# Patient Record
Sex: FEMALE | Race: WHITE | URBAN - METROPOLITAN AREA
[De-identification: names, ages, dates, MRNs, and addresses within clinical notes are randomized per-mention and may not be internally consistent; named-entity substitution may affect disease eponyms.]

---

## 2017-07-16 ENCOUNTER — EMERGENCY (EMERGENCY)
Facility: HOSPITAL | Age: 77
LOS: 1 days | End: 2017-07-16
Payer: MEDICARE

## 2017-07-16 PROCEDURE — 27786 TREATMENT OF ANKLE FRACTURE: CPT | Mod: 54

## 2017-07-16 PROCEDURE — 99284 EMERGENCY DEPT VISIT MOD MDM: CPT | Mod: 25

## 2017-07-16 PROCEDURE — 73610 X-RAY EXAM OF ANKLE: CPT | Mod: 26,RT

## 2023-02-02 PROBLEM — I25.10 CORONARY ARTERY CALCIFICATION SEEN ON CT SCAN: Status: ACTIVE | Noted: 2023-02-02

## 2023-02-02 PROBLEM — J34.2 DEVIATED SEPTUM: Status: ACTIVE | Noted: 2023-02-02

## 2023-02-02 PROBLEM — M50.90 CERVICAL DISC DISORDER: Status: ACTIVE | Noted: 2023-02-02

## 2023-02-02 PROBLEM — M54.9 UPPER BACK PAIN: Status: ACTIVE | Noted: 2023-02-02

## 2023-02-02 PROBLEM — H61.302: Status: ACTIVE | Noted: 2023-02-02

## 2023-02-02 PROBLEM — D72.9 NEUTROPHILIA: Status: ACTIVE | Noted: 2023-02-02

## 2023-02-02 PROBLEM — R91.1 PULMONARY NODULE: Status: ACTIVE | Noted: 2023-02-02

## 2023-02-02 PROBLEM — G47.01 INSOMNIA DUE TO MEDICAL CONDITION: Status: ACTIVE | Noted: 2023-02-02

## 2023-02-02 PROBLEM — M54.2 CHRONIC NECK AND BACK PAIN: Status: ACTIVE | Noted: 2023-02-02

## 2023-02-02 PROBLEM — M26.609 TEMPOROMANDIBULAR JOINT DYSFUNCTION SYNDROME: Status: ACTIVE | Noted: 2023-02-02

## 2023-02-02 PROBLEM — I10 ESSENTIAL HYPERTENSION: Status: ACTIVE | Noted: 2023-02-02

## 2023-02-02 PROBLEM — J30.0 VASOMOTOR RHINITIS: Status: ACTIVE | Noted: 2023-02-02

## 2023-02-02 PROBLEM — M48.10 DISH (DIFFUSE IDIOPATHIC SKELETAL HYPEROSTOSIS): Status: ACTIVE | Noted: 2023-02-02

## 2023-02-02 PROBLEM — R79.89 ABNORMAL CBC: Status: ACTIVE | Noted: 2023-02-02

## 2023-02-02 PROBLEM — R71.8 MICROCYTIC RED BLOOD CELLS: Status: ACTIVE | Noted: 2023-02-02

## 2023-02-02 PROBLEM — M51.36 DEGENERATION OF LUMBAR INTERVERTEBRAL DISC: Status: ACTIVE | Noted: 2023-02-02

## 2023-02-02 PROBLEM — D72.828 NEUTROPHILIA: Status: ACTIVE | Noted: 2023-02-02

## 2023-02-02 PROBLEM — R73.09 HEMOGLOBIN A1C LESS THAN 7.0%: Status: ACTIVE | Noted: 2023-02-02

## 2023-02-02 PROBLEM — G89.29 CHRONIC NECK AND BACK PAIN: Status: ACTIVE | Noted: 2023-02-02

## 2023-02-02 PROBLEM — E05.90 HYPERTHYROIDISM: Status: ACTIVE | Noted: 2023-02-02

## 2023-02-02 PROBLEM — M81.0 OSTEOPOROSIS: Status: ACTIVE | Noted: 2023-02-02

## 2023-02-02 PROBLEM — H93.293 AUDITORY DISCRIMINATION IMPAIRMENT, BILATERAL: Status: ACTIVE | Noted: 2023-02-02

## 2023-02-02 PROBLEM — M51.369 DEGENERATION OF LUMBAR INTERVERTEBRAL DISC: Status: ACTIVE | Noted: 2023-02-02

## 2023-02-02 PROBLEM — H90.3 BILATERAL SENSORINEURAL HEARING LOSS: Status: ACTIVE | Noted: 2023-02-02

## 2023-02-02 PROBLEM — H61.23 BILATERAL IMPACTED CERUMEN: Status: ACTIVE | Noted: 2023-02-02

## 2023-02-02 PROBLEM — E78.5 MILD HYPERLIPIDEMIA: Status: ACTIVE | Noted: 2023-02-02

## 2023-02-02 PROBLEM — R49.0 HOARSENESS OF VOICE: Status: ACTIVE | Noted: 2023-02-02

## 2023-02-02 PROBLEM — R35.0 URINARY FREQUENCY: Status: ACTIVE | Noted: 2023-02-02

## 2023-02-02 PROBLEM — M54.9 CHRONIC NECK AND BACK PAIN: Status: ACTIVE | Noted: 2023-02-02

## 2023-02-02 PROBLEM — M48.061 SPINAL STENOSIS OF LUMBAR REGION WITHOUT NEUROGENIC CLAUDICATION: Status: ACTIVE | Noted: 2023-02-02

## 2023-02-02 PROBLEM — L50.9 URTICARIA: Status: ACTIVE | Noted: 2023-02-02

## 2023-02-02 PROBLEM — R10.30 LOWER ABDOMINAL PAIN: Status: ACTIVE | Noted: 2023-02-02

## 2023-02-02 PROBLEM — E05.00 GRAVES' OPHTHALMOPATHY: Status: ACTIVE | Noted: 2023-02-02

## 2023-02-02 PROBLEM — H61.893 EAR CANAL DRYNESS, BILATERAL: Status: ACTIVE | Noted: 2023-02-02

## 2023-02-02 PROBLEM — R74.8 ELEVATED LIVER ENZYMES: Status: ACTIVE | Noted: 2023-02-02

## 2023-02-02 PROBLEM — E55.9 VITAMIN D DEFICIENCY: Status: ACTIVE | Noted: 2023-02-02

## 2023-02-02 PROBLEM — R79.89 LOW VITAMIN B12 LEVEL: Status: ACTIVE | Noted: 2023-02-02

## 2023-02-02 PROBLEM — R73.03 PREDIABETES: Status: ACTIVE | Noted: 2023-02-02

## 2023-02-02 PROBLEM — R49.9 HOARSENESS OR CHANGING VOICE: Status: ACTIVE | Noted: 2023-02-02

## 2023-02-02 PROBLEM — J98.11 ATELECTASIS PULMONARY: Status: ACTIVE | Noted: 2023-02-02

## 2023-02-02 PROBLEM — H61.22 EXCESSIVE CERUMEN IN EAR CANAL, LEFT: Status: ACTIVE | Noted: 2023-02-02

## 2023-02-02 PROBLEM — E89.0 POSTOPERATIVE HYPOTHYROIDISM: Status: ACTIVE | Noted: 2023-02-02

## 2023-02-02 PROBLEM — K21.9 GERD WITHOUT ESOPHAGITIS: Status: ACTIVE | Noted: 2023-02-02

## 2023-02-02 PROBLEM — R53.83 FATIGUE: Status: ACTIVE | Noted: 2023-02-02

## 2023-02-02 PROBLEM — M72.2 PLANTAR FASCIITIS: Status: ACTIVE | Noted: 2023-02-02

## 2023-02-02 PROBLEM — R82.90 ABNORMAL URINALYSIS: Status: ACTIVE | Noted: 2023-02-02

## 2023-02-02 RX ORDER — ROSUVASTATIN CALCIUM 5 MG/1
1 TABLET, COATED ORAL DAILY
COMMUNITY
Start: 2022-10-05 | End: 2023-07-06

## 2023-02-02 RX ORDER — AMLODIPINE BESYLATE 5 MG/1
5 TABLET ORAL DAILY
COMMUNITY
Start: 2016-09-07 | End: 2023-06-01 | Stop reason: SDUPTHER

## 2023-02-02 RX ORDER — ESTRADIOL 10 UG/1
10 INSERT VAGINAL 2 TIMES WEEKLY
COMMUNITY
Start: 2014-06-30

## 2023-02-02 RX ORDER — LEVOTHYROXINE SODIUM 75 UG/1
TABLET ORAL
COMMUNITY
Start: 2022-07-25

## 2023-02-02 RX ORDER — AMLODIPINE BESYLATE 2.5 MG/1
1 TABLET ORAL DAILY
COMMUNITY
Start: 2016-04-20 | End: 2023-06-01 | Stop reason: SDUPTHER

## 2023-03-15 ENCOUNTER — APPOINTMENT (OUTPATIENT)
Dept: PRIMARY CARE | Facility: CLINIC | Age: 83
End: 2023-03-15
Payer: MEDICARE

## 2023-03-23 LAB
ALANINE AMINOTRANSFERASE (SGPT) (U/L) IN SER/PLAS: 18 U/L (ref 7–45)
ANION GAP IN SER/PLAS: 12 MMOL/L (ref 10–20)
CALCIDIOL (25 OH VITAMIN D3) (NG/ML) IN SER/PLAS: 66 NG/ML
CALCIUM (MG/DL) IN SER/PLAS: 9.4 MG/DL (ref 8.6–10.6)
CARBON DIOXIDE, TOTAL (MMOL/L) IN SER/PLAS: 29 MMOL/L (ref 21–32)
CHLORIDE (MMOL/L) IN SER/PLAS: 102 MMOL/L (ref 98–107)
CHOLESTEROL (MG/DL) IN SER/PLAS: 181 MG/DL (ref 0–199)
CHOLESTEROL IN HDL (MG/DL) IN SER/PLAS: 87.3 MG/DL
CHOLESTEROL/HDL RATIO: 2.1
CREATININE (MG/DL) IN SER/PLAS: 0.83 MG/DL (ref 0.5–1.05)
ESTIMATED AVERAGE GLUCOSE FOR HBA1C: 120 MG/DL
GFR FEMALE: 70 ML/MIN/1.73M2
GLUCOSE (MG/DL) IN SER/PLAS: 86 MG/DL (ref 74–99)
HEMOGLOBIN A1C/HEMOGLOBIN TOTAL IN BLOOD: 5.8 %
LDL: 83 MG/DL (ref 0–99)
POTASSIUM (MMOL/L) IN SER/PLAS: 4.5 MMOL/L (ref 3.5–5.3)
SODIUM (MMOL/L) IN SER/PLAS: 138 MMOL/L (ref 136–145)
THYROTROPIN (MIU/L) IN SER/PLAS BY DETECTION LIMIT <= 0.05 MIU/L: 4.84 MIU/L (ref 0.44–3.98)
THYROXINE (T4) FREE (NG/DL) IN SER/PLAS: 1.23 NG/DL (ref 0.78–1.48)
TRIGLYCERIDE (MG/DL) IN SER/PLAS: 54 MG/DL (ref 0–149)
UREA NITROGEN (MG/DL) IN SER/PLAS: 23 MG/DL (ref 6–23)
VLDL: 11 MG/DL (ref 0–40)

## 2023-03-29 ENCOUNTER — APPOINTMENT (OUTPATIENT)
Dept: PRIMARY CARE | Facility: CLINIC | Age: 83
End: 2023-03-29

## 2023-06-01 ENCOUNTER — OFFICE VISIT (OUTPATIENT)
Dept: PRIMARY CARE | Facility: CLINIC | Age: 83
End: 2023-06-01
Payer: MEDICARE

## 2023-06-01 VITALS
DIASTOLIC BLOOD PRESSURE: 70 MMHG | OXYGEN SATURATION: 96 % | HEIGHT: 62 IN | SYSTOLIC BLOOD PRESSURE: 122 MMHG | BODY MASS INDEX: 20.06 KG/M2 | WEIGHT: 109 LBS | HEART RATE: 72 BPM

## 2023-06-01 DIAGNOSIS — Z00.00 ROUTINE GENERAL MEDICAL EXAMINATION AT HEALTH CARE FACILITY: Primary | ICD-10-CM

## 2023-06-01 DIAGNOSIS — I10 ESSENTIAL HYPERTENSION: ICD-10-CM

## 2023-06-01 DIAGNOSIS — G47.00 INSOMNIA, UNSPECIFIED TYPE: ICD-10-CM

## 2023-06-01 DIAGNOSIS — E78.5 MILD HYPERLIPIDEMIA: ICD-10-CM

## 2023-06-01 DIAGNOSIS — M81.0 OSTEOPOROSIS WITHOUT CURRENT PATHOLOGICAL FRACTURE, UNSPECIFIED OSTEOPOROSIS TYPE: ICD-10-CM

## 2023-06-01 DIAGNOSIS — E89.0 POSTOPERATIVE HYPOTHYROIDISM: ICD-10-CM

## 2023-06-01 PROCEDURE — 1160F RVW MEDS BY RX/DR IN RCRD: CPT | Performed by: INTERNAL MEDICINE

## 2023-06-01 PROCEDURE — 3074F SYST BP LT 130 MM HG: CPT | Performed by: INTERNAL MEDICINE

## 2023-06-01 PROCEDURE — 1159F MED LIST DOCD IN RCRD: CPT | Performed by: INTERNAL MEDICINE

## 2023-06-01 PROCEDURE — 99214 OFFICE O/P EST MOD 30 MIN: CPT | Performed by: INTERNAL MEDICINE

## 2023-06-01 PROCEDURE — 3078F DIAST BP <80 MM HG: CPT | Performed by: INTERNAL MEDICINE

## 2023-06-01 PROCEDURE — 1036F TOBACCO NON-USER: CPT | Performed by: INTERNAL MEDICINE

## 2023-06-01 PROCEDURE — G0439 PPPS, SUBSEQ VISIT: HCPCS | Performed by: INTERNAL MEDICINE

## 2023-06-01 PROCEDURE — 1170F FXNL STATUS ASSESSED: CPT | Performed by: INTERNAL MEDICINE

## 2023-06-01 RX ORDER — HYDROXYZINE HYDROCHLORIDE 10 MG/1
10 TABLET, FILM COATED ORAL ONCE
Qty: 90 TABLET | Refills: 1 | Status: SHIPPED | OUTPATIENT
Start: 2023-06-01 | End: 2023-06-02

## 2023-06-01 RX ORDER — AMLODIPINE BESYLATE 5 MG/1
5 TABLET ORAL DAILY
Qty: 90 TABLET | Refills: 1 | Status: SHIPPED | OUTPATIENT
Start: 2023-06-01 | End: 2023-12-06

## 2023-06-01 RX ORDER — AMLODIPINE BESYLATE 2.5 MG/1
2.5 TABLET ORAL DAILY
Qty: 90 TABLET | Refills: 1 | Status: SHIPPED | OUTPATIENT
Start: 2023-06-01 | End: 2023-12-06

## 2023-06-01 ASSESSMENT — ENCOUNTER SYMPTOMS
HYPERTENSION: 1
NERVOUS/ANXIOUS: 0
FATIGUE: 0
TREMORS: 0
DEPRESSED MOOD: 0
SHORTNESS OF BREATH: 0

## 2023-06-01 ASSESSMENT — ACTIVITIES OF DAILY LIVING (ADL)
DOING_HOUSEWORK: INDEPENDENT
GROCERY_SHOPPING: INDEPENDENT
MANAGING_FINANCES: INDEPENDENT
BATHING: INDEPENDENT
DRESSING: INDEPENDENT
TAKING_MEDICATION: INDEPENDENT

## 2023-06-01 ASSESSMENT — PATIENT HEALTH QUESTIONNAIRE - PHQ9
SUM OF ALL RESPONSES TO PHQ9 QUESTIONS 1 AND 2: 0
2. FEELING DOWN, DEPRESSED OR HOPELESS: NOT AT ALL
1. LITTLE INTEREST OR PLEASURE IN DOING THINGS: NOT AT ALL

## 2023-06-01 NOTE — PROGRESS NOTES
Subjective   Reason for Visit: Vera Fleming is an 82 y.o. female here for a Medicare Wellness visit and chronic medical problems.    Past Medical, Surgical, and Family History reviewed and updated in chart.    Reviewed all medications by prescribing practitioner or clinical pharmacist (such as prescriptions, OTCs, herbal therapies and supplements) and documented in the medical record.    Mammogram and dexa handled at [facility].  On synthroid for thyroid.  Atarax for sleep, would like reorder.  Meds and labs reviewed.    Thyroid Problem  Presents for follow-up visit. Patient reports no anxiety, depressed mood, fatigue or tremors. The symptoms have been stable. Her past medical history is significant for hyperlipidemia.   Hypertension  This is a chronic problem. The current episode started more than 1 year ago. The problem has been resolved since onset. The problem is controlled. Pertinent negatives include no chest pain or shortness of breath. There are no associated agents to hypertension. The current treatment provides significant improvement. There are no compliance problems.  Identifiable causes of hypertension include a thyroid problem.   Hyperlipidemia  This is a chronic problem. The current episode started more than 1 year ago. The problem is controlled. Recent lipid tests were reviewed and are low. Exacerbating diseases include hypothyroidism. There are no known factors aggravating her hyperlipidemia. Pertinent negatives include no chest pain or shortness of breath. Current antihyperlipidemic treatment includes statins. The current treatment provides significant improvement of lipids. There are no compliance problems.        Patient Care Team:  Ronnie Bryant MD as PCP - General  Ronnie Bryant MD as PCP - [employer] PCP     Review of Systems   Constitutional:  Negative for fatigue.   Respiratory:  Negative for shortness of breath.    Cardiovascular:  Negative for chest pain.   Neurological:   "Negative for tremors.   Psychiatric/Behavioral:  The patient is not nervous/anxious.        Objective   Vitals:  /70 (BP Location: Left arm, Patient Position: Sitting)   Pulse 72   Ht 1.575 m (5' 2\")   Wt 49.4 kg (109 lb)   SpO2 96%   BMI 19.94 kg/m²       Physical Exam  Constitutional:       Appearance: Normal appearance.   Cardiovascular:      Rate and Rhythm: Normal rate and regular rhythm.      Pulses: Normal pulses.      Heart sounds: Normal heart sounds.   Pulmonary:      Effort: Pulmonary effort is normal.      Breath sounds: Normal breath sounds.   Neurological:      General: No focal deficit present.      Mental Status: She is alert.   Psychiatric:         Mood and Affect: Mood normal.         Behavior: Behavior normal.         Thought Content: Thought content normal.         Judgment: Judgment normal.       Assessment/Plan   Problem List Items Addressed This Visit          Nervous    Insomnia    Current Assessment & Plan     Agree with hydroxyzine 10 mg bedtime for sleep.         Relevant Medications    hydrOXYzine HCL (Atarax) 10 mg tablet       Circulatory    Essential hypertension    Relevant Orders    CBC and Auto Differential    Comprehensive metabolic panel    Urinalysis with Reflex Microscopic       Musculoskeletal    Osteoporosis    Current Assessment & Plan     T score -3.2 right hip.  [facility] evaluation, pt declined treatment.  Continue calcium and D3.            Endocrine/Metabolic    Postoperative hypothyroidism    Current Assessment & Plan     Thyroid, free T4 and TSH rich with levothyroxine or synthroid.  Take medication 30 to 60 minutes before breakfast.             Other    Mild hyperlipidemia    Current Assessment & Plan     Lipids, LDL at goal with statin.  Recommend low fat/cholesterol diet.          Relevant Orders    CBC and Auto Differential    Comprehensive metabolic panel    Lipid panel    Routine general medical examination at health care facility - Primary    Relevant " Orders    1 Year Follow Up In Primary Care - Wellness Exam

## 2023-06-01 NOTE — ASSESSMENT & PLAN NOTE
Thyroid, free T4 and TSH rich with levothyroxine or synthroid.  Take medication 30 to 60 minutes before breakfast.

## 2023-06-02 ENCOUNTER — APPOINTMENT (OUTPATIENT)
Dept: PRIMARY CARE | Facility: CLINIC | Age: 83
End: 2023-06-02

## 2023-06-02 DIAGNOSIS — G47.00 INSOMNIA, UNSPECIFIED TYPE: ICD-10-CM

## 2023-06-02 RX ORDER — HYDROXYZINE HYDROCHLORIDE 10 MG/1
TABLET, FILM COATED ORAL
Qty: 90 TABLET | Refills: 1 | Status: SHIPPED | OUTPATIENT
Start: 2023-06-02 | End: 2023-06-02 | Stop reason: SDUPTHER

## 2023-06-02 RX ORDER — HYDROXYZINE HYDROCHLORIDE 10 MG/1
10 TABLET, FILM COATED ORAL DAILY
Qty: 90 TABLET | Refills: 1 | Status: SHIPPED | OUTPATIENT
Start: 2023-06-02 | End: 2023-09-26 | Stop reason: SDUPTHER

## 2023-06-21 ENCOUNTER — APPOINTMENT (OUTPATIENT)
Dept: PRIMARY CARE | Facility: CLINIC | Age: 83
End: 2023-06-21

## 2023-07-06 DIAGNOSIS — E78.5 MILD HYPERLIPIDEMIA: Primary | ICD-10-CM

## 2023-07-06 RX ORDER — ROSUVASTATIN CALCIUM 5 MG/1
TABLET, COATED ORAL
Qty: 90 TABLET | Refills: 3 | Status: SHIPPED | OUTPATIENT
Start: 2023-07-06

## 2023-09-13 ENCOUNTER — APPOINTMENT (OUTPATIENT)
Dept: PRIMARY CARE | Facility: CLINIC | Age: 83
End: 2023-09-13
Payer: MEDICARE

## 2023-09-21 ENCOUNTER — LAB (OUTPATIENT)
Dept: LAB | Facility: LAB | Age: 83
End: 2023-09-21
Payer: MEDICARE

## 2023-09-21 DIAGNOSIS — I10 ESSENTIAL HYPERTENSION: ICD-10-CM

## 2023-09-21 DIAGNOSIS — E78.5 MILD HYPERLIPIDEMIA: ICD-10-CM

## 2023-09-21 LAB
ALANINE AMINOTRANSFERASE (SGPT) (U/L) IN SER/PLAS: 20 U/L (ref 7–45)
ALBUMIN (G/DL) IN SER/PLAS: 4.3 G/DL (ref 3.4–5)
ALKALINE PHOSPHATASE (U/L) IN SER/PLAS: 66 U/L (ref 33–136)
ANION GAP IN SER/PLAS: 12 MMOL/L (ref 10–20)
ASPARTATE AMINOTRANSFERASE (SGOT) (U/L) IN SER/PLAS: 24 U/L (ref 9–39)
BASOPHILS (10*3/UL) IN BLOOD BY AUTOMATED COUNT: 0.04 X10E9/L (ref 0–0.1)
BASOPHILS/100 LEUKOCYTES IN BLOOD BY AUTOMATED COUNT: 0.6 % (ref 0–2)
BILIRUBIN TOTAL (MG/DL) IN SER/PLAS: 0.6 MG/DL (ref 0–1.2)
CALCIUM (MG/DL) IN SER/PLAS: 9.5 MG/DL (ref 8.6–10.6)
CARBON DIOXIDE, TOTAL (MMOL/L) IN SER/PLAS: 31 MMOL/L (ref 21–32)
CHLORIDE (MMOL/L) IN SER/PLAS: 103 MMOL/L (ref 98–107)
CHOLESTEROL (MG/DL) IN SER/PLAS: 163 MG/DL (ref 0–199)
CHOLESTEROL IN HDL (MG/DL) IN SER/PLAS: 82.1 MG/DL
CHOLESTEROL/HDL RATIO: 2
CREATININE (MG/DL) IN SER/PLAS: 0.97 MG/DL (ref 0.5–1.05)
EOSINOPHILS (10*3/UL) IN BLOOD BY AUTOMATED COUNT: 0 X10E9/L (ref 0–0.4)
EOSINOPHILS/100 LEUKOCYTES IN BLOOD BY AUTOMATED COUNT: 0 % (ref 0–6)
ERYTHROCYTE DISTRIBUTION WIDTH (RATIO) BY AUTOMATED COUNT: 12.2 % (ref 11.5–14.5)
ERYTHROCYTE MEAN CORPUSCULAR HEMOGLOBIN CONCENTRATION (G/DL) BY AUTOMATED: 32.4 G/DL (ref 32–36)
ERYTHROCYTE MEAN CORPUSCULAR VOLUME (FL) BY AUTOMATED COUNT: 93 FL (ref 80–100)
ERYTHROCYTES (10*6/UL) IN BLOOD BY AUTOMATED COUNT: 4.67 X10E12/L (ref 4–5.2)
GFR FEMALE: 58 ML/MIN/1.73M2
GLUCOSE (MG/DL) IN SER/PLAS: 86 MG/DL (ref 74–99)
HEMATOCRIT (%) IN BLOOD BY AUTOMATED COUNT: 43.2 % (ref 36–46)
HEMOGLOBIN (G/DL) IN BLOOD: 14 G/DL (ref 12–16)
IMMATURE GRANULOCYTES/100 LEUKOCYTES IN BLOOD BY AUTOMATED COUNT: 0.2 % (ref 0–0.9)
LDL: 71 MG/DL (ref 0–99)
LEUKOCYTES (10*3/UL) IN BLOOD BY AUTOMATED COUNT: 6.7 X10E9/L (ref 4.4–11.3)
LYMPHOCYTES (10*3/UL) IN BLOOD BY AUTOMATED COUNT: 2.31 X10E9/L (ref 0.8–3)
LYMPHOCYTES/100 LEUKOCYTES IN BLOOD BY AUTOMATED COUNT: 34.7 % (ref 13–44)
MONOCYTES (10*3/UL) IN BLOOD BY AUTOMATED COUNT: 0.68 X10E9/L (ref 0.05–0.8)
MONOCYTES/100 LEUKOCYTES IN BLOOD BY AUTOMATED COUNT: 10.2 % (ref 2–10)
NEUTROPHILS (10*3/UL) IN BLOOD BY AUTOMATED COUNT: 3.61 X10E9/L (ref 1.6–5.5)
NEUTROPHILS/100 LEUKOCYTES IN BLOOD BY AUTOMATED COUNT: 54.3 % (ref 40–80)
NRBC (PER 100 WBCS) BY AUTOMATED COUNT: 0 /100 WBC (ref 0–0)
PLATELETS (10*3/UL) IN BLOOD AUTOMATED COUNT: 230 X10E9/L (ref 150–450)
POTASSIUM (MMOL/L) IN SER/PLAS: 4.5 MMOL/L (ref 3.5–5.3)
PROTEIN TOTAL: 6.7 G/DL (ref 6.4–8.2)
SODIUM (MMOL/L) IN SER/PLAS: 141 MMOL/L (ref 136–145)
TRIGLYCERIDE (MG/DL) IN SER/PLAS: 49 MG/DL (ref 0–149)
UREA NITROGEN (MG/DL) IN SER/PLAS: 25 MG/DL (ref 6–23)
VLDL: 10 MG/DL (ref 0–40)

## 2023-09-21 PROCEDURE — 80061 LIPID PANEL: CPT

## 2023-09-21 PROCEDURE — 80053 COMPREHEN METABOLIC PANEL: CPT

## 2023-09-21 PROCEDURE — 85025 COMPLETE CBC W/AUTO DIFF WBC: CPT

## 2023-09-21 PROCEDURE — 81003 URINALYSIS AUTO W/O SCOPE: CPT

## 2023-09-21 PROCEDURE — 36415 COLL VENOUS BLD VENIPUNCTURE: CPT

## 2023-09-22 LAB
APPEARANCE, URINE: CLEAR
BILIRUBIN, URINE: NEGATIVE
BLOOD, URINE: NEGATIVE
COLOR, URINE: COLORLESS
GLUCOSE, URINE: NEGATIVE MG/DL
KETONES, URINE: NEGATIVE MG/DL
LEUKOCYTE ESTERASE, URINE: NEGATIVE
NITRITE, URINE: NEGATIVE
PH, URINE: 7 (ref 5–8)
PROTEIN, URINE: NEGATIVE MG/DL
SPECIFIC GRAVITY, URINE: 1 (ref 1–1.03)
UROBILINOGEN, URINE: <2 MG/DL (ref 0–1.9)

## 2023-09-26 ENCOUNTER — OFFICE VISIT (OUTPATIENT)
Dept: PRIMARY CARE | Facility: CLINIC | Age: 83
End: 2023-09-26
Payer: MEDICARE

## 2023-09-26 VITALS
HEIGHT: 62 IN | OXYGEN SATURATION: 98 % | HEART RATE: 68 BPM | DIASTOLIC BLOOD PRESSURE: 70 MMHG | SYSTOLIC BLOOD PRESSURE: 118 MMHG | WEIGHT: 113 LBS | BODY MASS INDEX: 20.8 KG/M2

## 2023-09-26 DIAGNOSIS — J47.9 BRONCHIECTASIS, UNCOMPLICATED (MULTI): ICD-10-CM

## 2023-09-26 DIAGNOSIS — I10 ESSENTIAL HYPERTENSION: ICD-10-CM

## 2023-09-26 DIAGNOSIS — G47.00 INSOMNIA, UNSPECIFIED TYPE: ICD-10-CM

## 2023-09-26 DIAGNOSIS — N28.9 RENAL INSUFFICIENCY: ICD-10-CM

## 2023-09-26 DIAGNOSIS — E46 PROTEIN-CALORIE MALNUTRITION, UNSPECIFIED SEVERITY (MULTI): Primary | ICD-10-CM

## 2023-09-26 DIAGNOSIS — E89.0 POSTOPERATIVE HYPOTHYROIDISM: ICD-10-CM

## 2023-09-26 DIAGNOSIS — E78.5 MILD HYPERLIPIDEMIA: ICD-10-CM

## 2023-09-26 DIAGNOSIS — R73.03 PREDIABETES: ICD-10-CM

## 2023-09-26 PROBLEM — R73.09 HEMOGLOBIN A1C LESS THAN 7.0%: Status: RESOLVED | Noted: 2023-02-02 | Resolved: 2023-09-26

## 2023-09-26 PROCEDURE — 1126F AMNT PAIN NOTED NONE PRSNT: CPT | Performed by: INTERNAL MEDICINE

## 2023-09-26 PROCEDURE — 99214 OFFICE O/P EST MOD 30 MIN: CPT | Performed by: INTERNAL MEDICINE

## 2023-09-26 PROCEDURE — 3074F SYST BP LT 130 MM HG: CPT | Performed by: INTERNAL MEDICINE

## 2023-09-26 PROCEDURE — 3078F DIAST BP <80 MM HG: CPT | Performed by: INTERNAL MEDICINE

## 2023-09-26 PROCEDURE — 1036F TOBACCO NON-USER: CPT | Performed by: INTERNAL MEDICINE

## 2023-09-26 PROCEDURE — 1160F RVW MEDS BY RX/DR IN RCRD: CPT | Performed by: INTERNAL MEDICINE

## 2023-09-26 PROCEDURE — 1159F MED LIST DOCD IN RCRD: CPT | Performed by: INTERNAL MEDICINE

## 2023-09-26 RX ORDER — HYDROXYZINE HYDROCHLORIDE 10 MG/1
10-20 TABLET, FILM COATED ORAL NIGHTLY PRN
Qty: 180 TABLET | Refills: 1 | Status: SHIPPED | OUTPATIENT
Start: 2023-09-26

## 2023-09-26 ASSESSMENT — ENCOUNTER SYMPTOMS
DIZZINESS: 0
ABDOMINAL PAIN: 0
MYALGIAS: 0
FATIGUE: 0
SHORTNESS OF BREATH: 0
HYPERTENSION: 1

## 2023-09-26 NOTE — PROGRESS NOTES
"Subjective   Patient ID: Vera Fleming is a 83 y.o. female who presents for Follow-up chronic medical problems.    Here with .  Urology placed on gemtesa for urinary frequency.  Labs reviewed, GFR 58.      Hypertension  This is a chronic problem. The current episode started more than 1 year ago. The problem has been resolved since onset. The problem is controlled. Pertinent negatives include no chest pain or shortness of breath. There are no associated agents to hypertension. The current treatment provides significant improvement. There are no compliance problems.    Hyperlipidemia  This is a chronic problem. The current episode started more than 1 year ago. The problem is controlled. Recent lipid tests were reviewed and are low. Exacerbating diseases include hypothyroidism. There are no known factors aggravating her hyperlipidemia. Pertinent negatives include no chest pain, myalgias or shortness of breath. The current treatment provides significant improvement of lipids. There are no compliance problems.         Review of Systems   Constitutional:  Negative for fatigue.   Respiratory:  Negative for shortness of breath.    Cardiovascular:  Negative for chest pain.   Gastrointestinal:  Negative for abdominal pain.   Musculoskeletal:  Negative for myalgias.   Neurological:  Negative for dizziness.       Objective   /70 (BP Location: Left arm, Patient Position: Sitting)   Pulse 68   Ht 1.575 m (5' 2\")   Wt 51.3 kg (113 lb)   SpO2 98%   BMI 20.67 kg/m²     Physical Exam  Constitutional:       Appearance: Normal appearance.   Cardiovascular:      Rate and Rhythm: Normal rate and regular rhythm.      Pulses: Normal pulses.      Heart sounds: Normal heart sounds.   Pulmonary:      Effort: Pulmonary effort is normal.      Breath sounds: Normal breath sounds.   Neurological:      General: No focal deficit present.      Mental Status: She is alert.   Psychiatric:         Mood and Affect: Mood normal.        "  Behavior: Behavior normal.         Thought Content: Thought content normal.         Judgment: Judgment normal.       Assessment/Plan   Problem List Items Addressed This Visit             ICD-10-CM       Cardiac and Vasculature    Essential hypertension I10     BP at goal with current medications.  Rec low salt diet.  Check BP at home, goal < 140/90.           Mild hyperlipidemia E78.5     Lipids, LDL at goal with statin.  Recommend low fat/cholesterol diet.              Endocrine/Metabolic    Postoperative hypothyroidism E89.0     Thyroid, free T4 and TSH rich with levothyroxine or synthroid.  Take medication 30 to 60 minutes before breakfast.           Prediabetes R73.03     A1c 5.8 with diet.         Relevant Orders    Hemoglobin A1c    Protein-calorie malnutrition, unspecified severity (CMS/[facility]) - Primary E46     BMI 20.67.            Pulmonary and Pneumonias    Bronchiectasis, uncomplicated (CMS/HCC) J47.9     Asymptomatic, follow.            Sleep    Insomnia G47.00     Rec hydroxyzine 10-20 mg bedtime for sleep.         Relevant Medications    hydrOXYzine HCL (Atarax) 10 mg tablet     Other Visit Diagnoses         Codes    Renal insufficiency     N28.9    Relevant Orders    Basic metabolic panel

## 2023-09-29 ENCOUNTER — LAB (OUTPATIENT)
Dept: LAB | Facility: LAB | Age: 83
End: 2023-09-29
Payer: MEDICARE

## 2023-09-29 DIAGNOSIS — N28.9 RENAL INSUFFICIENCY: ICD-10-CM

## 2023-09-29 DIAGNOSIS — R73.03 PREDIABETES: ICD-10-CM

## 2023-09-29 LAB
ANION GAP IN SER/PLAS: 13 MMOL/L (ref 10–20)
CALCIUM (MG/DL) IN SER/PLAS: 9 MG/DL (ref 8.6–10.6)
CARBON DIOXIDE, TOTAL (MMOL/L) IN SER/PLAS: 26 MMOL/L (ref 21–32)
CHLORIDE (MMOL/L) IN SER/PLAS: 103 MMOL/L (ref 98–107)
CREATININE (MG/DL) IN SER/PLAS: 0.85 MG/DL (ref 0.5–1.05)
ESTIMATED AVERAGE GLUCOSE FOR HBA1C: 123 MG/DL
GFR FEMALE: 68 ML/MIN/1.73M2
GLUCOSE (MG/DL) IN SER/PLAS: 79 MG/DL (ref 74–99)
HEMOGLOBIN A1C/HEMOGLOBIN TOTAL IN BLOOD: 5.9 %
POTASSIUM (MMOL/L) IN SER/PLAS: 4.2 MMOL/L (ref 3.5–5.3)
SODIUM (MMOL/L) IN SER/PLAS: 138 MMOL/L (ref 136–145)
UREA NITROGEN (MG/DL) IN SER/PLAS: 23 MG/DL (ref 6–23)

## 2023-09-29 PROCEDURE — 80048 BASIC METABOLIC PNL TOTAL CA: CPT

## 2023-09-29 PROCEDURE — 83036 HEMOGLOBIN GLYCOSYLATED A1C: CPT

## 2023-09-29 PROCEDURE — 36415 COLL VENOUS BLD VENIPUNCTURE: CPT

## 2024-01-09 ENCOUNTER — TELEPHONE (OUTPATIENT)
Dept: PRIMARY CARE | Facility: CLINIC | Age: 84
End: 2024-01-09
Payer: MEDICARE

## 2024-01-10 DIAGNOSIS — E89.0 POSTOPERATIVE HYPOTHYROIDISM: ICD-10-CM

## 2024-01-10 DIAGNOSIS — R73.03 PREDIABETES: ICD-10-CM

## 2024-01-10 DIAGNOSIS — E78.5 MILD HYPERLIPIDEMIA: Primary | ICD-10-CM

## 2024-01-25 PROBLEM — H04.123 DRY EYE SYNDROME OF BILATERAL LACRIMAL GLANDS: Status: ACTIVE | Noted: 2020-06-22

## 2024-01-25 PROBLEM — R60.0 EDEMA OF LOWER EXTREMITY: Status: ACTIVE | Noted: 2024-01-25

## 2024-01-25 PROBLEM — T15.11XA FOREIGN BODY OF RIGHT CONJUNCTIVA: Status: ACTIVE | Noted: 2020-06-22

## 2024-01-25 PROBLEM — N95.1 SYMPTOMATIC MENOPAUSAL OR FEMALE CLIMACTERIC STATES: Status: ACTIVE | Noted: 2024-01-25

## 2024-01-25 PROBLEM — N28.9 RENAL INSUFFICIENCY: Status: ACTIVE | Noted: 2023-09-29

## 2024-01-25 PROBLEM — F41.9 ANXIETY: Status: ACTIVE | Noted: 2021-11-01

## 2024-01-25 PROBLEM — M79.646 PAIN OF FINGER: Status: ACTIVE | Noted: 2019-12-20

## 2024-01-25 PROBLEM — H02.88A MEIBOMIAN GLAND DYSFUNCTION (MGD) OF UPPER AND LOWER LIDS OF BOTH EYES: Status: ACTIVE | Noted: 2020-06-22

## 2024-01-25 PROBLEM — R63.4 WEIGHT LOSS: Status: ACTIVE | Noted: 2024-01-25

## 2024-01-25 PROBLEM — R06.02 SHORTNESS OF BREATH: Status: ACTIVE | Noted: 2024-01-25

## 2024-01-25 PROBLEM — R20.0 NUMBNESS OF FOOT: Status: ACTIVE | Noted: 2017-12-19

## 2024-01-25 PROBLEM — N32.81 OVERACTIVE BLADDER: Status: ACTIVE | Noted: 2024-01-25

## 2024-01-25 PROBLEM — H02.88B MEIBOMIAN GLAND DYSFUNCTION (MGD) OF UPPER AND LOWER LIDS OF BOTH EYES: Status: ACTIVE | Noted: 2020-06-22

## 2024-01-25 PROBLEM — G56.23 LESION OF ULNAR NERVE, BILATERAL: Status: ACTIVE | Noted: 2018-04-12

## 2024-01-25 PROBLEM — Z86.39 HISTORY OF METABOLIC DISORDER: Status: ACTIVE | Noted: 2024-01-25

## 2024-01-25 PROBLEM — N28.9 ACUTE RENAL INSUFFICIENCY: Status: ACTIVE | Noted: 2024-01-25

## 2024-01-25 PROBLEM — N95.2 VAGINAL ATROPHY: Status: ACTIVE | Noted: 2024-01-25

## 2024-01-25 PROBLEM — H25.11 CATARACT, NUCLEAR SCLEROTIC SENILE, RIGHT: Status: ACTIVE | Noted: 2017-12-19

## 2024-01-25 PROBLEM — R05.9 COUGH: Status: ACTIVE | Noted: 2024-01-25

## 2024-01-25 PROBLEM — T07.XXXA MULTIPLE FRACTURES: Status: ACTIVE | Noted: 2023-01-11

## 2024-01-25 PROBLEM — M18.0 PRIMARY OSTEOARTHRITIS OF BOTH FIRST CARPOMETACARPAL JOINTS: Status: ACTIVE | Noted: 2018-04-12

## 2024-01-25 PROBLEM — G31.84 MILD COGNITIVE IMPAIRMENT: Status: ACTIVE | Noted: 2021-11-01

## 2024-01-25 PROBLEM — M67.442 DIGITAL MUCINOUS CYST OF FINGER OF LEFT HAND: Status: ACTIVE | Noted: 2018-04-12

## 2024-01-25 RX ORDER — PROPRANOLOL HYDROCHLORIDE 10 MG/1
TABLET ORAL
COMMUNITY
Start: 2021-11-08

## 2024-01-25 RX ORDER — MELATONIN 5 MG
CAPSULE ORAL
COMMUNITY

## 2024-01-25 RX ORDER — BIMATOPROST 3 UG/ML
SOLUTION TOPICAL
COMMUNITY
Start: 2023-02-17

## 2024-01-25 RX ORDER — VIBEGRON 75 MG/1
TABLET, FILM COATED ORAL
COMMUNITY
Start: 2023-09-10

## 2024-01-25 RX ORDER — TRIAMCINOLONE ACETONIDE 1 MG/G
CREAM TOPICAL
COMMUNITY
Start: 2022-06-30

## 2024-01-25 RX ORDER — PHENOL 1.4 %
AEROSOL, SPRAY (ML) MUCOUS MEMBRANE
COMMUNITY
Start: 2018-10-31

## 2024-01-25 RX ORDER — PANTOPRAZOLE SODIUM 40 MG/1
TABLET, DELAYED RELEASE ORAL
COMMUNITY
Start: 2020-03-11 | End: 2024-01-30 | Stop reason: WASHOUT

## 2024-01-25 RX ORDER — ATORVASTATIN CALCIUM 20 MG/1
TABLET, FILM COATED ORAL EVERY 24 HOURS
COMMUNITY
End: 2024-01-30 | Stop reason: WASHOUT

## 2024-01-26 ENCOUNTER — LAB (OUTPATIENT)
Dept: LAB | Facility: LAB | Age: 84
End: 2024-01-26
Payer: MEDICARE

## 2024-01-26 DIAGNOSIS — E89.0 POSTOPERATIVE HYPOTHYROIDISM: ICD-10-CM

## 2024-01-26 DIAGNOSIS — R73.03 PREDIABETES: ICD-10-CM

## 2024-01-26 DIAGNOSIS — E78.5 MILD HYPERLIPIDEMIA: ICD-10-CM

## 2024-01-26 LAB
ALT SERPL W P-5'-P-CCNC: 17 U/L (ref 7–45)
ANION GAP SERPL CALC-SCNC: 11 MMOL/L (ref 10–20)
BUN SERPL-MCNC: 20 MG/DL (ref 6–23)
CALCIUM SERPL-MCNC: 9.3 MG/DL (ref 8.6–10.6)
CHLORIDE SERPL-SCNC: 101 MMOL/L (ref 98–107)
CHOLEST SERPL-MCNC: 173 MG/DL (ref 0–199)
CHOLESTEROL/HDL RATIO: 2.2
CO2 SERPL-SCNC: 30 MMOL/L (ref 21–32)
CREAT SERPL-MCNC: 0.77 MG/DL (ref 0.5–1.05)
EGFRCR SERPLBLD CKD-EPI 2021: 77 ML/MIN/1.73M*2
EST. AVERAGE GLUCOSE BLD GHB EST-MCNC: 117 MG/DL
GLUCOSE SERPL-MCNC: 79 MG/DL (ref 74–99)
HBA1C MFR BLD: 5.7 %
HDLC SERPL-MCNC: 79.5 MG/DL
LDLC SERPL CALC-MCNC: 82 MG/DL
NON HDL CHOLESTEROL: 94 MG/DL (ref 0–149)
POTASSIUM SERPL-SCNC: 4.5 MMOL/L (ref 3.5–5.3)
SODIUM SERPL-SCNC: 137 MMOL/L (ref 136–145)
T4 FREE SERPL-MCNC: 1.23 NG/DL (ref 0.78–1.48)
TRIGL SERPL-MCNC: 60 MG/DL (ref 0–149)
TSH SERPL-ACNC: 1.52 MIU/L (ref 0.44–3.98)
VLDL: 12 MG/DL (ref 0–40)

## 2024-01-26 PROCEDURE — 84439 ASSAY OF FREE THYROXINE: CPT

## 2024-01-26 PROCEDURE — 80048 BASIC METABOLIC PNL TOTAL CA: CPT

## 2024-01-26 PROCEDURE — 83036 HEMOGLOBIN GLYCOSYLATED A1C: CPT

## 2024-01-26 PROCEDURE — 80061 LIPID PANEL: CPT

## 2024-01-26 PROCEDURE — 84443 ASSAY THYROID STIM HORMONE: CPT

## 2024-01-26 PROCEDURE — 36415 COLL VENOUS BLD VENIPUNCTURE: CPT

## 2024-01-26 PROCEDURE — 84460 ALANINE AMINO (ALT) (SGPT): CPT

## 2024-01-27 NOTE — PROGRESS NOTES
Subjective   Patient ID: Vera Fleming is a 83 y.o. female who presents for No chief complaint on file..  HPI  This 83-year-old female is being seen back in the office for 6-month recheck of her ears and head and neck.  She does have a history of bilateral sensorineural hearing loss for which she uses hearing aids.  Her last audiogram was about 1 year ago.  There is been no history of vertigo and/or tinnitus.  In the past she has had some problems with ear canal irritation and has been advised to use lubrication in that area.  We did at her last visit discussed barotrauma for her understanding because of pressure sensations feel felt during flying.  Review of Systems    Objective   Physical Exam  EXAMINATION:     GENERAL VERENICE.EARANCE: Alert, in no acute distress, normal pitch and clarity of voice, well-developed and nourished, cooperative.     HEAD/FACE: Normocephalic, atraumatic, normal facial movements and strength, no no tenderness to palpation, no lesions noted.     SKIN: Normal turgor, no raised or ulcerative lesions, warm and dry to palpation.     EYES: Extraocular motions intact, no nystagmus noted, pupils equal and reactive to light and accommodation, no conjunctivitis.     EARS: Both ears--external ear anatomy is normal without lesions, auditory canals are patent and without skin abrasions or lesions, dry ceruminous buildup was noted minimally bilaterally which was removed otoscopically at this time. The tympanic membranes were intact and the skin was noted to be dry.     NOSE: No external skin lesions are noted, nares are patent, septum is intact and deviated to the right, dryness is noted to the anterior septal lining, sinuses are nontender to palpation bilaterally, no intranasal lesions or inflammation is noted, nasal valve is normal. Dryness in the nasal vestibular area is noted     OROPHARYNX/ORAL CAVITY: Oropharynx is not inflamed and is without lesions, mucosa of the oral cavity is intact and without  lesions, tongue is midline and mobile, no acute dental disease is noted, TMJs are mobile without crepitus noted     NECK: No lymphadenopathy is palpated, carotid pulses are intact, neck is supple with full range of motion, no thyroid abnormalities are noted, trachea is midline, no neck masses are palpated.     LYMPHATICS: No cervical adenopathy or supraclavicular adenopathy is palpated.     NEUROLOGIC/PSYCH; alert and oriented, cranial nerves are grossly intact, gait is without falling, no motor deficits are noted.    Patient ID: Vera Fleming is a 83 y.o. female.    Ear cerumen removal    Date/Time: 1/31/2024 10:49 AM    Performed by: Cristobal Trejo DMD, MD  Authorized by: Cristobal Trejo DMD, MD    Consent:     Consent obtained:  Verbal    Consent given by:  Patient    Risks discussed:  Pain and incomplete removal    Alternatives discussed:  No treatment and alternative treatment  Universal protocol:     Procedure explained and questions answered to patient or proxy's satisfaction: yes      Imaging studies available: no      Required blood products, implants, devices, and special equipment available: no      Patient identity confirmed:  Verbally with patient  Procedure details:     Location:  L ear and R ear    Procedure type: curette      Procedure type comment:  Or suction    Procedure outcomes: cerumen removed    Post-procedure details:     Inspection:  No bleeding and ear canal clear    Hearing quality:  Improved    Procedure completion:  Tolerated well, no immediate complications  Comments:      Microscopic evaluation of both ears was done.  The ear canals were both narrow and dry ceruminous epithelial debris was entrapped about midway down in the canals.  This was able to be removed with wax loop and cup forcep.  Both tympanic membranes were otherwise normal in appearance.  There was dryness of the skin noted diffusely.       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  She does have narrow ear canals that and traps dry cerumen and skin especially from hearing aid use.  I reminded her she can use external lubrication around the ear canal opening and a drop of baby oil to help lubricate the skin to help facilitate some of this self-cleaning ability the ears.  She should stay in a 6-month checkup schedule to have her ears debrided.  She is seeing an outside audiologist and has her hearing screen and there.  I suggest she try to bring a copy of the most recent hearing test with her to review.  She is having no difficulties with discomfort in the upper aerodigestive tract and has had no problems with nasal bleeding although dryness nasally does occur and I suggested nasal saline gel sprays.  She is also using Aquaphor around the nasal opening.       Cristobal Trejo DMD, MD 01/27/24 11:47 AM

## 2024-01-30 ENCOUNTER — OFFICE VISIT (OUTPATIENT)
Dept: PRIMARY CARE | Facility: CLINIC | Age: 84
End: 2024-01-30
Payer: MEDICARE

## 2024-01-30 VITALS
DIASTOLIC BLOOD PRESSURE: 68 MMHG | BODY MASS INDEX: 20.98 KG/M2 | SYSTOLIC BLOOD PRESSURE: 116 MMHG | OXYGEN SATURATION: 97 % | HEART RATE: 68 BPM | HEIGHT: 62 IN | WEIGHT: 114 LBS

## 2024-01-30 DIAGNOSIS — E78.5 MILD HYPERLIPIDEMIA: ICD-10-CM

## 2024-01-30 DIAGNOSIS — J47.9 BRONCHIECTASIS, UNCOMPLICATED (MULTI): ICD-10-CM

## 2024-01-30 DIAGNOSIS — E89.0 POSTOPERATIVE HYPOTHYROIDISM: ICD-10-CM

## 2024-01-30 DIAGNOSIS — Z00.00 ROUTINE GENERAL MEDICAL EXAMINATION AT HEALTH CARE FACILITY: Primary | ICD-10-CM

## 2024-01-30 DIAGNOSIS — R73.03 PREDIABETES: ICD-10-CM

## 2024-01-30 DIAGNOSIS — I10 ESSENTIAL HYPERTENSION: ICD-10-CM

## 2024-01-30 DIAGNOSIS — M81.0 OSTEOPOROSIS WITHOUT CURRENT PATHOLOGICAL FRACTURE, UNSPECIFIED OSTEOPOROSIS TYPE: ICD-10-CM

## 2024-01-30 PROBLEM — M79.646 PAIN OF FINGER: Status: RESOLVED | Noted: 2019-12-20 | Resolved: 2024-01-30

## 2024-01-30 PROBLEM — J98.11 ATELECTASIS PULMONARY: Status: RESOLVED | Noted: 2023-02-02 | Resolved: 2024-01-30

## 2024-01-30 PROBLEM — R49.0 HOARSENESS OF VOICE: Status: RESOLVED | Noted: 2023-02-02 | Resolved: 2024-01-30

## 2024-01-30 PROBLEM — R49.9 HOARSENESS OR CHANGING VOICE: Status: RESOLVED | Noted: 2023-02-02 | Resolved: 2024-01-30

## 2024-01-30 PROBLEM — T15.11XA FOREIGN BODY OF RIGHT CONJUNCTIVA: Status: RESOLVED | Noted: 2020-06-22 | Resolved: 2024-01-30

## 2024-01-30 PROBLEM — H61.893 EAR CANAL DRYNESS, BILATERAL: Status: RESOLVED | Noted: 2023-02-02 | Resolved: 2024-01-30

## 2024-01-30 PROBLEM — R82.90 ABNORMAL URINALYSIS: Status: RESOLVED | Noted: 2023-02-02 | Resolved: 2024-01-30

## 2024-01-30 PROBLEM — N28.9 ACUTE RENAL INSUFFICIENCY: Status: RESOLVED | Noted: 2024-01-25 | Resolved: 2024-01-30

## 2024-01-30 PROBLEM — G31.84 MILD COGNITIVE IMPAIRMENT: Status: RESOLVED | Noted: 2021-11-01 | Resolved: 2024-01-30

## 2024-01-30 PROBLEM — N95.2 VAGINAL ATROPHY: Status: RESOLVED | Noted: 2024-01-25 | Resolved: 2024-01-30

## 2024-01-30 PROBLEM — N28.9 RENAL INSUFFICIENCY: Status: RESOLVED | Noted: 2023-09-29 | Resolved: 2024-01-30

## 2024-01-30 PROBLEM — R35.0 URINARY FREQUENCY: Status: RESOLVED | Noted: 2023-02-02 | Resolved: 2024-01-30

## 2024-01-30 PROBLEM — L50.9 URTICARIA: Status: RESOLVED | Noted: 2023-02-02 | Resolved: 2024-01-30

## 2024-01-30 PROBLEM — R60.0 EDEMA OF LOWER EXTREMITY: Status: RESOLVED | Noted: 2024-01-25 | Resolved: 2024-01-30

## 2024-01-30 PROBLEM — R53.83 FATIGUE: Status: RESOLVED | Noted: 2023-02-02 | Resolved: 2024-01-30

## 2024-01-30 PROBLEM — R63.4 WEIGHT LOSS: Status: RESOLVED | Noted: 2024-01-25 | Resolved: 2024-01-30

## 2024-01-30 PROBLEM — R20.0 NUMBNESS OF FOOT: Status: RESOLVED | Noted: 2017-12-19 | Resolved: 2024-01-30

## 2024-01-30 PROBLEM — H61.22 EXCESSIVE CERUMEN IN EAR CANAL, LEFT: Status: RESOLVED | Noted: 2023-02-02 | Resolved: 2024-01-30

## 2024-01-30 PROBLEM — R79.89 ABNORMAL CBC: Status: RESOLVED | Noted: 2023-02-02 | Resolved: 2024-01-30

## 2024-01-30 PROBLEM — R05.9 COUGH: Status: RESOLVED | Noted: 2024-01-25 | Resolved: 2024-01-30

## 2024-01-30 PROBLEM — T07.XXXA MULTIPLE FRACTURES: Status: RESOLVED | Noted: 2023-01-11 | Resolved: 2024-01-30

## 2024-01-30 PROBLEM — R10.30 LOWER ABDOMINAL PAIN: Status: RESOLVED | Noted: 2023-02-02 | Resolved: 2024-01-30

## 2024-01-30 PROBLEM — R06.02 SHORTNESS OF BREATH: Status: RESOLVED | Noted: 2024-01-25 | Resolved: 2024-01-30

## 2024-01-30 PROBLEM — E46 PROTEIN-CALORIE MALNUTRITION, UNSPECIFIED SEVERITY (MULTI): Status: RESOLVED | Noted: 2023-09-26 | Resolved: 2024-01-30

## 2024-01-30 PROBLEM — R74.8 ELEVATED LIVER ENZYMES: Status: RESOLVED | Noted: 2023-02-02 | Resolved: 2024-01-30

## 2024-01-30 PROCEDURE — 1159F MED LIST DOCD IN RCRD: CPT | Performed by: INTERNAL MEDICINE

## 2024-01-30 PROCEDURE — G0439 PPPS, SUBSEQ VISIT: HCPCS | Performed by: INTERNAL MEDICINE

## 2024-01-30 PROCEDURE — 1036F TOBACCO NON-USER: CPT | Performed by: INTERNAL MEDICINE

## 2024-01-30 PROCEDURE — 1126F AMNT PAIN NOTED NONE PRSNT: CPT | Performed by: INTERNAL MEDICINE

## 2024-01-30 PROCEDURE — 1160F RVW MEDS BY RX/DR IN RCRD: CPT | Performed by: INTERNAL MEDICINE

## 2024-01-30 PROCEDURE — 1170F FXNL STATUS ASSESSED: CPT | Performed by: INTERNAL MEDICINE

## 2024-01-30 PROCEDURE — 99214 OFFICE O/P EST MOD 30 MIN: CPT | Performed by: INTERNAL MEDICINE

## 2024-01-30 PROCEDURE — 3074F SYST BP LT 130 MM HG: CPT | Performed by: INTERNAL MEDICINE

## 2024-01-30 PROCEDURE — 3078F DIAST BP <80 MM HG: CPT | Performed by: INTERNAL MEDICINE

## 2024-01-30 RX ORDER — FAMOTIDINE 20 MG/1
20 TABLET, FILM COATED ORAL DAILY
COMMUNITY

## 2024-01-30 ASSESSMENT — ENCOUNTER SYMPTOMS
ABDOMINAL PAIN: 0
SHORTNESS OF BREATH: 0
MYALGIAS: 0
DIZZINESS: 0
HYPERTENSION: 1
FATIGUE: 0

## 2024-01-30 ASSESSMENT — ACTIVITIES OF DAILY LIVING (ADL)
MANAGING_FINANCES: INDEPENDENT
BATHING: INDEPENDENT
GROCERY_SHOPPING: INDEPENDENT
DOING_HOUSEWORK: INDEPENDENT
DRESSING: INDEPENDENT
TAKING_MEDICATION: INDEPENDENT

## 2024-01-30 NOTE — PROGRESS NOTES
"Subjective   Reason for Visit: Vera Fleming is an 83 y.o. female here for a Medicare Wellness visit and chronic medical problems.    Past Medical, Surgical, and Family History reviewed and updated in chart.    Reviewed all medications by prescribing practitioner or clinical pharmacist (such as prescriptions, OTCs, herbal therapies and supplements) and documented in the medical record.    Here with .  Doing well.  On gemtesa for OAB.  [facility] following thyroid.  Meds and labs reviewed.    Hypertension  This is a chronic problem. The current episode started more than 1 year ago. The problem has been resolved since onset. The problem is controlled. Pertinent negatives include no chest pain or shortness of breath. There are no associated agents to hypertension. The current treatment provides significant improvement. There are no compliance problems.    Hyperlipidemia  This is a chronic problem. The current episode started more than 1 year ago. The problem is controlled. Recent lipid tests were reviewed and are low. Exacerbating diseases include hypothyroidism. There are no known factors aggravating her hyperlipidemia. Pertinent negatives include no chest pain, myalgias or shortness of breath. Current antihyperlipidemic treatment includes statins. The current treatment provides significant improvement of lipids. There are no compliance problems.        Patient Care Team:  Ronnie Bryant MD as PCP - General  Ronnie Bryant MD as PCP - [employer] PCP     Review of Systems   Constitutional:  Negative for fatigue.   Respiratory:  Negative for shortness of breath.    Cardiovascular:  Negative for chest pain.   Gastrointestinal:  Negative for abdominal pain.   Musculoskeletal:  Negative for myalgias.   Neurological:  Negative for dizziness.       Objective   Vitals:  /68 (BP Location: Right arm, Patient Position: Sitting)   Pulse 68   Ht 1.575 m (5' 2\")   Wt 51.7 kg (114 lb)   SpO2 97%   " BMI 20.85 kg/m²       Physical Exam  Constitutional:       Appearance: Normal appearance.   Neck:      Vascular: No carotid bruit.   Cardiovascular:      Rate and Rhythm: Normal rate and regular rhythm.      Pulses: Normal pulses.      Heart sounds: Normal heart sounds.   Pulmonary:      Effort: Pulmonary effort is normal.      Breath sounds: Normal breath sounds.   Neurological:      General: No focal deficit present.      Mental Status: She is alert.   Psychiatric:         Mood and Affect: Mood normal.         Behavior: Behavior normal.         Thought Content: Thought content normal.         Judgment: Judgment normal.       Assessment/Plan   Problem List Items Addressed This Visit       Essential hypertension    Current Assessment & Plan     BP at goal with current medications.  Rec low salt diet.  Check BP at home, goal < 140/90.           Relevant Orders    CBC and Auto Differential    Comprehensive metabolic panel    Urinalysis with Reflex Microscopic    Mild hyperlipidemia    Current Assessment & Plan     Lipids, LDL at goal with statin.  Recommend low fat/cholesterol diet.           Relevant Orders    CBC and Auto Differential    Comprehensive metabolic panel    Lipid Panel    Osteoporosis    Relevant Orders    Vitamin D 25-Hydroxy,Total (for eval of Vitamin D levels)    Postoperative hypothyroidism    Current Assessment & Plan     Thyroid, free T4 and TSH rich with levothyroxine or synthroid.  Take medication 30 to 60 minutes before breakfast.           Relevant Orders    TSH    T4, free    Prediabetes    Current Assessment & Plan     A1c 5.7 with diet.         Relevant Orders    CBC and Auto Differential    Comprehensive metabolic panel    Hemoglobin A1C    Routine general medical examination at health care facility - Primary    Relevant Orders    1 Year Follow Up In Primary Care - Wellness Exam    Bronchiectasis, uncomplicated (CMS/HCC)    Current Assessment & Plan     Mild changes noted middle lobe  and lingula 2021.  No clinical symptoms.

## 2024-01-31 ENCOUNTER — OFFICE VISIT (OUTPATIENT)
Dept: OTOLARYNGOLOGY | Facility: CLINIC | Age: 84
End: 2024-01-31
Payer: MEDICARE

## 2024-01-31 VITALS — WEIGHT: 120 LBS | BODY MASS INDEX: 22.08 KG/M2 | HEIGHT: 62 IN

## 2024-01-31 DIAGNOSIS — H61.893 EAR CANAL DRYNESS, BILATERAL: ICD-10-CM

## 2024-01-31 DIAGNOSIS — H61.302 STENOSIS OF EXTERNAL AUDITORY CANAL, LEFT: Primary | ICD-10-CM

## 2024-01-31 DIAGNOSIS — H61.23 BILATERAL IMPACTED CERUMEN: ICD-10-CM

## 2024-01-31 DIAGNOSIS — H93.293 AUDITORY DISCRIMINATION IMPAIRMENT, BILATERAL: ICD-10-CM

## 2024-01-31 DIAGNOSIS — K21.9 GERD WITHOUT ESOPHAGITIS: ICD-10-CM

## 2024-01-31 DIAGNOSIS — H90.3 BILATERAL SENSORINEURAL HEARING LOSS: ICD-10-CM

## 2024-01-31 PROCEDURE — 1126F AMNT PAIN NOTED NONE PRSNT: CPT | Performed by: OTOLARYNGOLOGY

## 2024-01-31 PROCEDURE — 99213 OFFICE O/P EST LOW 20 MIN: CPT | Performed by: OTOLARYNGOLOGY

## 2024-01-31 PROCEDURE — 1036F TOBACCO NON-USER: CPT | Performed by: OTOLARYNGOLOGY

## 2024-01-31 PROCEDURE — 1159F MED LIST DOCD IN RCRD: CPT | Performed by: OTOLARYNGOLOGY

## 2024-01-31 PROCEDURE — 69210 REMOVE IMPACTED EAR WAX UNI: CPT | Performed by: OTOLARYNGOLOGY

## 2024-01-31 PROCEDURE — 1160F RVW MEDS BY RX/DR IN RCRD: CPT | Performed by: OTOLARYNGOLOGY

## 2024-02-19 DIAGNOSIS — I10 ESSENTIAL HYPERTENSION: ICD-10-CM

## 2024-02-19 RX ORDER — AMLODIPINE BESYLATE 5 MG/1
TABLET ORAL
Qty: 90 TABLET | Refills: 3 | Status: SHIPPED | OUTPATIENT
Start: 2024-02-19

## 2024-02-19 RX ORDER — AMLODIPINE BESYLATE 2.5 MG/1
2.5 TABLET ORAL DAILY
Qty: 90 TABLET | Refills: 3 | Status: SHIPPED | OUTPATIENT
Start: 2024-02-19

## 2024-06-12 DIAGNOSIS — E78.5 MILD HYPERLIPIDEMIA: ICD-10-CM

## 2024-06-12 RX ORDER — ROSUVASTATIN CALCIUM 5 MG/1
TABLET, COATED ORAL
Qty: 90 TABLET | Refills: 3 | Status: SHIPPED | OUTPATIENT
Start: 2024-06-12

## 2024-07-25 ENCOUNTER — LAB (OUTPATIENT)
Dept: LAB | Facility: LAB | Age: 84
End: 2024-07-25
Payer: MEDICARE

## 2024-07-25 DIAGNOSIS — E89.0 POSTOPERATIVE HYPOTHYROIDISM: ICD-10-CM

## 2024-07-25 DIAGNOSIS — R73.03 PREDIABETES: ICD-10-CM

## 2024-07-25 DIAGNOSIS — I10 ESSENTIAL HYPERTENSION: ICD-10-CM

## 2024-07-25 DIAGNOSIS — E78.5 MILD HYPERLIPIDEMIA: ICD-10-CM

## 2024-07-25 DIAGNOSIS — M81.0 OSTEOPOROSIS WITHOUT CURRENT PATHOLOGICAL FRACTURE, UNSPECIFIED OSTEOPOROSIS TYPE: ICD-10-CM

## 2024-07-25 LAB
APPEARANCE UR: CLEAR
BACTERIA #/AREA URNS AUTO: ABNORMAL /HPF
BASOPHILS # BLD AUTO: 0.05 X10*3/UL (ref 0–0.1)
BASOPHILS NFR BLD AUTO: 0.8 %
BILIRUB UR STRIP.AUTO-MCNC: NEGATIVE MG/DL
COLOR UR: COLORLESS
EOSINOPHIL # BLD AUTO: 0.12 X10*3/UL (ref 0–0.4)
EOSINOPHIL NFR BLD AUTO: 1.8 %
ERYTHROCYTE [DISTWIDTH] IN BLOOD BY AUTOMATED COUNT: 12.5 % (ref 11.5–14.5)
EST. AVERAGE GLUCOSE BLD GHB EST-MCNC: 128 MG/DL
GLUCOSE UR STRIP.AUTO-MCNC: NORMAL MG/DL
HBA1C MFR BLD: 6.1 %
HCT VFR BLD AUTO: 43.4 % (ref 36–46)
HGB BLD-MCNC: 13.8 G/DL (ref 12–16)
IMM GRANULOCYTES # BLD AUTO: 0.03 X10*3/UL (ref 0–0.5)
IMM GRANULOCYTES NFR BLD AUTO: 0.5 % (ref 0–0.9)
KETONES UR STRIP.AUTO-MCNC: NEGATIVE MG/DL
LEUKOCYTE ESTERASE UR QL STRIP.AUTO: ABNORMAL
LYMPHOCYTES # BLD AUTO: 1.85 X10*3/UL (ref 0.8–3)
LYMPHOCYTES NFR BLD AUTO: 28.2 %
MCH RBC QN AUTO: 29.1 PG (ref 26–34)
MCHC RBC AUTO-ENTMCNC: 31.8 G/DL (ref 32–36)
MCV RBC AUTO: 92 FL (ref 80–100)
MONOCYTES # BLD AUTO: 0.61 X10*3/UL (ref 0.05–0.8)
MONOCYTES NFR BLD AUTO: 9.3 %
NEUTROPHILS # BLD AUTO: 3.89 X10*3/UL (ref 1.6–5.5)
NEUTROPHILS NFR BLD AUTO: 59.4 %
NITRITE UR QL STRIP.AUTO: NEGATIVE
NRBC BLD-RTO: 0 /100 WBCS (ref 0–0)
PH UR STRIP.AUTO: 7 [PH]
PLATELET # BLD AUTO: 249 X10*3/UL (ref 150–450)
PROT UR STRIP.AUTO-MCNC: NEGATIVE MG/DL
RBC # BLD AUTO: 4.74 X10*6/UL (ref 4–5.2)
RBC # UR STRIP.AUTO: NEGATIVE /UL
RBC #/AREA URNS AUTO: ABNORMAL /HPF
SP GR UR STRIP.AUTO: 1.01
SQUAMOUS #/AREA URNS AUTO: ABNORMAL /HPF
UROBILINOGEN UR STRIP.AUTO-MCNC: NORMAL MG/DL
WBC # BLD AUTO: 6.6 X10*3/UL (ref 4.4–11.3)
WBC #/AREA URNS AUTO: ABNORMAL /HPF

## 2024-07-25 PROCEDURE — 81001 URINALYSIS AUTO W/SCOPE: CPT

## 2024-07-25 PROCEDURE — 85025 COMPLETE CBC W/AUTO DIFF WBC: CPT

## 2024-07-25 PROCEDURE — 36415 COLL VENOUS BLD VENIPUNCTURE: CPT

## 2024-07-25 PROCEDURE — 84439 ASSAY OF FREE THYROXINE: CPT

## 2024-07-25 PROCEDURE — 80061 LIPID PANEL: CPT

## 2024-07-25 PROCEDURE — 80053 COMPREHEN METABOLIC PANEL: CPT

## 2024-07-25 PROCEDURE — 84443 ASSAY THYROID STIM HORMONE: CPT

## 2024-07-25 PROCEDURE — 82306 VITAMIN D 25 HYDROXY: CPT

## 2024-07-25 PROCEDURE — 83036 HEMOGLOBIN GLYCOSYLATED A1C: CPT

## 2024-07-26 LAB
25(OH)D3 SERPL-MCNC: 60 NG/ML (ref 30–100)
ALBUMIN SERPL BCP-MCNC: 4.4 G/DL (ref 3.4–5)
ALP SERPL-CCNC: 68 U/L (ref 33–136)
ALT SERPL W P-5'-P-CCNC: 18 U/L (ref 7–45)
ANION GAP SERPL CALC-SCNC: 13 MMOL/L (ref 10–20)
AST SERPL W P-5'-P-CCNC: 22 U/L (ref 9–39)
BILIRUB SERPL-MCNC: 0.6 MG/DL (ref 0–1.2)
BUN SERPL-MCNC: 23 MG/DL (ref 6–23)
CALCIUM SERPL-MCNC: 9.4 MG/DL (ref 8.6–10.6)
CHLORIDE SERPL-SCNC: 102 MMOL/L (ref 98–107)
CHOLEST SERPL-MCNC: 201 MG/DL (ref 0–199)
CHOLESTEROL/HDL RATIO: 2.2
CO2 SERPL-SCNC: 29 MMOL/L (ref 21–32)
CREAT SERPL-MCNC: 0.84 MG/DL (ref 0.5–1.05)
EGFRCR SERPLBLD CKD-EPI 2021: 69 ML/MIN/1.73M*2
GLUCOSE SERPL-MCNC: 95 MG/DL (ref 74–99)
HDLC SERPL-MCNC: 90.7 MG/DL
LDLC SERPL CALC-MCNC: 98 MG/DL
NON HDL CHOLESTEROL: 110 MG/DL (ref 0–149)
POTASSIUM SERPL-SCNC: 4.4 MMOL/L (ref 3.5–5.3)
PROT SERPL-MCNC: 6.9 G/DL (ref 6.4–8.2)
SODIUM SERPL-SCNC: 140 MMOL/L (ref 136–145)
T4 FREE SERPL-MCNC: 1.33 NG/DL (ref 0.78–1.48)
TRIGL SERPL-MCNC: 62 MG/DL (ref 0–149)
TSH SERPL-ACNC: 7.19 MIU/L (ref 0.44–3.98)
VLDL: 12 MG/DL (ref 0–40)

## 2024-07-29 ENCOUNTER — OFFICE VISIT (OUTPATIENT)
Dept: OTOLARYNGOLOGY | Facility: CLINIC | Age: 84
End: 2024-07-29
Payer: MEDICARE

## 2024-07-29 ENCOUNTER — APPOINTMENT (OUTPATIENT)
Dept: OTOLARYNGOLOGY | Facility: CLINIC | Age: 84
End: 2024-07-29
Payer: MEDICARE

## 2024-07-29 ENCOUNTER — CLINICAL SUPPORT (OUTPATIENT)
Dept: AUDIOLOGY | Facility: CLINIC | Age: 84
End: 2024-07-29

## 2024-07-29 VITALS — HEIGHT: 62 IN | BODY MASS INDEX: 23.74 KG/M2 | WEIGHT: 129 LBS

## 2024-07-29 DIAGNOSIS — J30.0 VASOMOTOR RHINITIS: ICD-10-CM

## 2024-07-29 DIAGNOSIS — H90.3 BILATERAL SENSORINEURAL HEARING LOSS: Primary | ICD-10-CM

## 2024-07-29 DIAGNOSIS — H93.293 AUDITORY DISCRIMINATION IMPAIRMENT, BILATERAL: ICD-10-CM

## 2024-07-29 DIAGNOSIS — H90.3 SENSORINEURAL HEARING LOSS (SNHL) OF BOTH EARS: Primary | ICD-10-CM

## 2024-07-29 DIAGNOSIS — H61.23 BILATERAL IMPACTED CERUMEN: ICD-10-CM

## 2024-07-29 DIAGNOSIS — H61.302 STENOSIS OF EXTERNAL AUDITORY CANAL, LEFT: ICD-10-CM

## 2024-07-29 PROCEDURE — 69210 REMOVE IMPACTED EAR WAX UNI: CPT | Performed by: OTOLARYNGOLOGY

## 2024-07-29 PROCEDURE — 1036F TOBACCO NON-USER: CPT | Performed by: OTOLARYNGOLOGY

## 2024-07-29 PROCEDURE — 1159F MED LIST DOCD IN RCRD: CPT | Performed by: OTOLARYNGOLOGY

## 2024-07-29 PROCEDURE — 1160F RVW MEDS BY RX/DR IN RCRD: CPT | Performed by: OTOLARYNGOLOGY

## 2024-07-29 PROCEDURE — 99213 OFFICE O/P EST LOW 20 MIN: CPT | Performed by: OTOLARYNGOLOGY

## 2024-07-29 NOTE — PROGRESS NOTES
[facility]  WALK IN  HEARING AID CHECK      Name:  Vera Fleming  YOB: 1940  Today's date:  07/29/24    Patient buying hearing aid batteries for her .        Chon Ga.  Doctor of Audiology  Senior Audiologist

## 2024-07-30 ENCOUNTER — APPOINTMENT (OUTPATIENT)
Dept: PRIMARY CARE | Facility: CLINIC | Age: 84
End: 2024-07-30
Payer: MEDICARE

## 2024-07-30 VITALS
HEIGHT: 62 IN | HEART RATE: 72 BPM | SYSTOLIC BLOOD PRESSURE: 118 MMHG | OXYGEN SATURATION: 97 % | BODY MASS INDEX: 21.53 KG/M2 | WEIGHT: 117 LBS | DIASTOLIC BLOOD PRESSURE: 70 MMHG

## 2024-07-30 DIAGNOSIS — E78.5 MILD HYPERLIPIDEMIA: ICD-10-CM

## 2024-07-30 DIAGNOSIS — E89.0 POSTOPERATIVE HYPOTHYROIDISM: Primary | ICD-10-CM

## 2024-07-30 DIAGNOSIS — I10 ESSENTIAL HYPERTENSION: ICD-10-CM

## 2024-07-30 DIAGNOSIS — R73.03 PREDIABETES: ICD-10-CM

## 2024-07-30 PROCEDURE — 3078F DIAST BP <80 MM HG: CPT | Performed by: INTERNAL MEDICINE

## 2024-07-30 PROCEDURE — 1159F MED LIST DOCD IN RCRD: CPT | Performed by: INTERNAL MEDICINE

## 2024-07-30 PROCEDURE — 3074F SYST BP LT 130 MM HG: CPT | Performed by: INTERNAL MEDICINE

## 2024-07-30 PROCEDURE — 1160F RVW MEDS BY RX/DR IN RCRD: CPT | Performed by: INTERNAL MEDICINE

## 2024-07-30 PROCEDURE — 99214 OFFICE O/P EST MOD 30 MIN: CPT | Performed by: INTERNAL MEDICINE

## 2024-07-30 ASSESSMENT — ENCOUNTER SYMPTOMS
WEIGHT GAIN: 1
TREMORS: 0
PALPITATIONS: 0
MYALGIAS: 0
NERVOUS/ANXIOUS: 0
DEPRESSED MOOD: 0
SHORTNESS OF BREATH: 0
DIZZINESS: 0
HYPERTENSION: 1
FATIGUE: 0

## 2024-07-30 NOTE — PROGRESS NOTES
"Subjective   Patient ID: Vera Fleming is a 84 y.o. female who presents for Follow-up chronic medical problems.    Back pain, handled at [facility].  Treated with medrol dose pack.  Meds and labs reviewed.    Hypertension  This is a chronic problem. The current episode started more than 1 year ago. The problem has been resolved since onset. The problem is controlled. Pertinent negatives include no chest pain, palpitations or shortness of breath. There are no associated agents to hypertension. The current treatment provides significant improvement. There are no compliance problems.  Identifiable causes of hypertension include a thyroid problem.   Hyperlipidemia  This is a chronic problem. The current episode started more than 1 year ago. The problem is controlled. Recent lipid tests were reviewed and are low. Exacerbating diseases include hypothyroidism. There are no known factors aggravating her hyperlipidemia. Pertinent negatives include no chest pain, myalgias or shortness of breath. Current antihyperlipidemic treatment includes statins. The current treatment provides significant improvement of lipids. There are no compliance problems.    Thyroid Problem  Presents for follow-up visit. Symptoms include weight gain. Patient reports no anxiety, depressed mood, fatigue, palpitations or tremors. The symptoms have been stable. Her past medical history is significant for hyperlipidemia.        Review of Systems   Constitutional:  Positive for weight gain. Negative for fatigue.   Respiratory:  Negative for shortness of breath.    Cardiovascular:  Negative for chest pain and palpitations.   Musculoskeletal:  Negative for myalgias.   Neurological:  Negative for dizziness and tremors.   Psychiatric/Behavioral:  The patient is not nervous/anxious.        Objective   /70 (BP Location: Left arm, Patient Position: Sitting)   Pulse 72   Ht 1.575 m (5' 2\")   Wt 53.1 kg (117 lb)   SpO2 97%   BMI 21.40 kg/m² "     Physical Exam  Constitutional:       Appearance: Normal appearance.   Neck:      Vascular: No carotid bruit.   Cardiovascular:      Rate and Rhythm: Normal rate and regular rhythm.      Pulses: Normal pulses.      Heart sounds: Normal heart sounds.   Pulmonary:      Effort: Pulmonary effort is normal.      Breath sounds: Normal breath sounds.   Neurological:      General: No focal deficit present.      Mental Status: She is alert.   Psychiatric:         Mood and Affect: Mood normal.         Behavior: Behavior normal.         Thought Content: Thought content normal.         Judgment: Judgment normal.         Assessment/Plan   Problem List Items Addressed This Visit             ICD-10-CM    Essential hypertension I10     BP at goal with current medications.  Rec low salt diet.  Check BP at home, goal < 140/90.           Mild hyperlipidemia E78.5     Lipids, LDL at goal with statin.  Recommend low fat/cholesterol diet.           Postoperative hypothyroidism - Primary E89.0     Thyroid, free T4 and TSH rich with levothyroxine or synthroid.  Take medication 30 to 60 minutes before breakfast.           Relevant Orders    TSH    T4, free    Prediabetes R73.03     A1c 6.1 with diet.                26-Mar-2019 11:25

## 2024-09-04 ENCOUNTER — TELEPHONE (OUTPATIENT)
Dept: PRIMARY CARE | Facility: CLINIC | Age: 84
End: 2024-09-04
Payer: MEDICARE

## 2024-09-04 NOTE — TELEPHONE ENCOUNTER
Vera called in wanting to know if you feel she and Akhil should get the new covid19 booster?  If so, should they wait 2 weeks after to receive the flu vax?

## 2024-09-26 DIAGNOSIS — G47.00 INSOMNIA, UNSPECIFIED TYPE: ICD-10-CM

## 2024-09-26 RX ORDER — HYDROXYZINE HYDROCHLORIDE 10 MG/1
TABLET, FILM COATED ORAL
Qty: 180 TABLET | Refills: 1 | Status: SHIPPED | OUTPATIENT
Start: 2024-09-26

## 2024-12-06 NOTE — PROGRESS NOTES
Subjective   Patient ID: Vera Fleming is a 84 y.o. female who presents for No chief complaint on file..  HPI  Is a 4-year-old female last seen in the office in July is being seen in follow-up.  She does have a history of bilateral sensorineural hearing loss for which she wears hearing aids.  She does have mildly narrow ear canals and ceruminous buildup does occur along with dry skin.  There is been no acute change in hearing or other problems in the head and neck to report.  Review of Systems   A 12 point ROS  has been reviewed and are negative for complaint except for what is stated in the history of present illness and /or for past medical history as noted in the EMR.       Past Medical History:   Diagnosis Date    Acute upper respiratory infection, unspecified 11/18/2015    Acute URI    Cardiomegaly 03/17/2021    Mild cardiomegaly    COVID-19 02/17/2021    COVID-19 virus detected    Disorder of kidney and ureter, unspecified 06/09/2016    Acute renal insufficiency    Disorder of thyroid, unspecified     Thyroid trouble    Hordeolum externum left upper eyelid 07/30/2018    Hordeolum externum of left upper eyelid    Hypo-osmolality and hyponatremia 07/13/2016    Dilutional hyponatremia    Localized edema 06/18/2014    Leg edema    Low back pain 11/09/2017    Acute low back pain, unspecified back pain laterality, with sciatica presence unspecified    Other abnormal auditory perceptions, bilateral 03/04/2020    Abnormal auditory perception of both ears    Other abnormal glucose 03/11/2020    Elevated glucose level    Other chest pain 02/18/2020    Atypical chest pain    Other conditions influencing health status 02/17/2021    History of cough    Other disturbances of smell and taste 11/20/2019    Bitter taste    Other specified abnormal findings of blood chemistry 07/13/2016    Elevated serum creatinine    Pain in left hip 02/12/2020    Left hip pain    Pain in unspecified joint 04/08/2014    Arthralgia    Pain in  unspecified limb 04/08/2014    Limb pain    Personal history of diseases of the skin and subcutaneous tissue 04/29/2016    History of drug rash    Personal history of other diseases of the circulatory system 08/04/2015    History of hypertension    Personal history of other diseases of the musculoskeletal system and connective tissue 06/02/2020    History of neck pain    Personal history of other diseases of the musculoskeletal system and connective tissue 08/05/2014    History of backache    Personal history of other endocrine, nutritional and metabolic disease     History of high cholesterol    Personal history of other endocrine, nutritional and metabolic disease 10/18/2016    History of hypercholesterolemia    Personal history of other specified conditions 10/16/2019    History of shortness of breath    Personal history of other specified conditions 08/05/2014    History of urinary frequency    Personal history of other specified conditions 05/03/2021    History of weight loss    Spontaneous ecchymoses 09/09/2014    Petechial hemorrhage    Strain of muscle, fascia and tendon of the posterior muscle group at thigh level, left thigh, initial encounter 11/09/2017    Strain of left hamstring    Strain of muscle, fascia and tendon of the posterior muscle group at thigh level, right thigh, initial encounter 11/09/2017    Strain of right hamstring    Toxic effect of venom of other arthropod, accidental (unintentional), initial encounter 08/04/2015    Local reaction to insect sting        Current Outpatient Medications:     hydrOXYzine HCL (Atarax) 10 mg tablet, TAKE ONE TO TWO TABLETS BY MOUTH AT BEDTIME FOR ITCHING, Disp: 180 tablet, Rfl: 1    amLODIPine (Norvasc) 2.5 mg tablet, TAKE 1 TABLET BY MOUTH ONCE DAILY, Disp: 90 tablet, Rfl: 3    amLODIPine (Norvasc) 5 mg tablet, Take 1 tablet by mouth once daily with 2.5mg for total dose of 7.5mg daily, Disp: 90 tablet, Rfl: 3    bimatoprost (Latisse) 0.03 % ophthalmic  solution, Place one drop on applicator and apply evenly along the skin of the upper eyelid at base of eyelashes once daily at bedtime; repeat procedure for second eye, Disp: , Rfl:     CALCIUM CARBONATE-VITAMIN D3 ORAL, Take 1 tablet by mouth 1 (one) time each day., Disp: , Rfl:     estradiol (Vagifem) 10 mcg tablet vaginal tablet, Insert 1 tablet (10 mcg) into the vagina 2 times a week., Disp: , Rfl:     famotidine (Pepcid AC) 20 mg tablet, Take 1 tablet (20 mg) by mouth once daily., Disp: , Rfl:     Gemtesa 75 mg tablet, , Disp: , Rfl:     levothyroxine (Synthroid, Levoxyl) 75 mcg tablet, Take by mouth., Disp: , Rfl:     melatonin 5 mg capsule, Take by mouth., Disp: , Rfl:     multivitamin with minerals (Adults Multivitamin) tablet, Take by mouth., Disp: , Rfl:     propranolol (Inderal) 10 mg tablet, Take by mouth., Disp: , Rfl:     rosuvastatin (Crestor) 5 mg tablet, TAKE 1 TABLET BY MOUTH ONCE DAILY, Disp: 90 tablet, Rfl: 3    triamcinolone (Kenalog) 0.1 % cream, Apply to rash on neck twice daily for 2 weeks, stop one week, resume as needed, Disp: , Rfl:      Allergies   Allergen Reactions    Hydrocodone-Acetaminophen Other, Nausea/vomiting and Nausea Only     Nausea;; vomiting    Antihistamine [Diphenhydramine Hcl] Unknown    Diphenhydramine Unknown    Ether Other     Rita Renaline anesthetic in [facility]    Irbesartan Diarrhea    Latex Unknown    Lisinopril Cough    Losartan Diarrhea    Meperidine Unknown    Midazolam Unknown    Tetanus Vaccines And Toxoid Unknown    Sulfa (Sulfonamide Antibiotics) Rash       There were no vitals taken for this visit.    Objective   Physical Exam  EXAMINATION:     GENERAL VERENICE.EARANCE: Alert, in no acute distress, normal pitch and clarity of voice, well-developed and nourished, cooperative.     HEAD/FACE: Normocephalic, atraumatic, normal facial movements and strength, no no tenderness to palpation, no lesions noted.     SKIN: Normal turgor, no raised or ulcerative lesions, warm  and dry to palpation.     EYES: Extraocular motions intact, no nystagmus noted, pupils equal and reactive to light and accommodation, no conjunctivitis.     EARS: Both ears--external ear anatomy is normal without lesions, auditory canals are patent and without skin abrasions or lesions, dry ceruminous buildup was noted minimally bilaterally which was removed otoscopically at this time. The tympanic membranes were intact and the skin was noted to be dry.     NOSE: No external skin lesions are noted, nares are patent, septum is intact and deviated to the right, dryness is noted to the anterior septal lining, sinuses are nontender to palpation bilaterally, no intranasal lesions or inflammation is noted, nasal valve is normal. Dryness in the nasal vestibular area is noted     OROPHARYNX/ORAL CAVITY: Oropharynx is not inflamed and is without lesions, mucosa of the oral cavity is intact and without lesions, tongue is midline and mobile, no acute dental disease is noted, TMJs are mobile without crepitus noted     NECK: No lymphadenopathy is palpated, carotid pulses are intact, neck is supple with full range of motion, no thyroid abnormalities are noted, trachea is midline, no neck masses are palpated.     LYMPHATICS: No cervical adenopathy or supraclavicular adenopathy is palpated.     NEUROLOGIC/PSYCH; alert and oriented, cranial nerves are grossly intact, gait is without falling, no motor deficits are noted.    Patient ID: Vera Fleming is a 84 y.o. female.    Binocular microscopy    Date/Time: 12/10/2024 11:00 AM    Performed by: Cristobal Trejo DMD, MD  Authorized by: Cristobal Trejo DMD, MD    Consent:     Consent obtained:  Verbal    Consent given by:  Patient    Risks discussed:  Pain    Alternatives discussed:  No treatment and observation  Procedure details:     Indications: debridement and ear cerumen removal    Post-procedure details:     Patient tolerance of procedure:  Tolerated well, no immediate  complications  Comments:      Microscopic evaluation of both ears was done.  Ear canal stenosis is noted along with dried epithelial debris and dry ceruminous debris obstructing to the outer third of the canals.  This is a chronic recurring problem.  Once removed the tympanic membrane's were intact with no signs of inflammation.    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 Dry skin in the ear canals well account for a variety of symptoms including itching, irritation, pain if the skin is infected which can then also be associated with otorrhea. The skin in the ear canal is unlike skin anywhere else and can be treated by lubrication. External hand cream can be utilized on the external ear and opening of the ear canal along with the use of oil drops such as baby oil or sweet oil or prescription drops in the form of fluocinolone oil. Avoidance of water in the ear and avoidance of Q-tip use is recommended. If pain and otorrhea develops then infection is suggested and medicated antibiotic drops or acetic acid drops would be used as treatment.  Her last audiogram in this office was in the early part of 2023.  She should have a repeat study done at her next follow-up if in agreement.  He does continue to use Aquaphor for the anterior aspect of her nose to counteract dryness.  This patient is advised to follow up with their PCP for all other health care issues and treatment. Dictation was done with dragon transcription and errors in spelling  and diction are possible.       Cristobal Trejo DMD, MD 12/10/24 10:59 AM

## 2024-12-10 ENCOUNTER — APPOINTMENT (OUTPATIENT)
Dept: OTOLARYNGOLOGY | Facility: CLINIC | Age: 84
End: 2024-12-10
Payer: MEDICARE

## 2024-12-10 DIAGNOSIS — H61.893 EAR CANAL DRYNESS, BILATERAL: ICD-10-CM

## 2024-12-10 DIAGNOSIS — H90.3 BILATERAL SENSORINEURAL HEARING LOSS: Primary | ICD-10-CM

## 2024-12-10 DIAGNOSIS — J30.0 VASOMOTOR RHINITIS: ICD-10-CM

## 2024-12-10 DIAGNOSIS — H61.302 STENOSIS OF EXTERNAL AUDITORY CANAL, LEFT: ICD-10-CM

## 2024-12-10 DIAGNOSIS — H61.23 BILATERAL IMPACTED CERUMEN: ICD-10-CM

## 2024-12-10 PROCEDURE — 1036F TOBACCO NON-USER: CPT | Performed by: OTOLARYNGOLOGY

## 2024-12-10 PROCEDURE — 1159F MED LIST DOCD IN RCRD: CPT | Performed by: OTOLARYNGOLOGY

## 2024-12-10 PROCEDURE — 1160F RVW MEDS BY RX/DR IN RCRD: CPT | Performed by: OTOLARYNGOLOGY

## 2024-12-10 PROCEDURE — 99213 OFFICE O/P EST LOW 20 MIN: CPT | Performed by: OTOLARYNGOLOGY

## 2024-12-10 PROCEDURE — 1157F ADVNC CARE PLAN IN RCRD: CPT | Performed by: OTOLARYNGOLOGY

## 2024-12-10 PROCEDURE — 92504 EAR MICROSCOPY EXAMINATION: CPT | Performed by: OTOLARYNGOLOGY

## 2024-12-10 RX ORDER — FLUOCINOLONE ACETONIDE 0.11 MG/ML
OIL AURICULAR (OTIC)
Qty: 20 ML | Refills: 2 | Status: SHIPPED | OUTPATIENT
Start: 2024-12-10 | End: 2024-12-10

## 2024-12-10 RX ORDER — FLUOCINOLONE ACETONIDE 0.11 MG/ML
OIL AURICULAR (OTIC)
Qty: 20 ML | Refills: 2 | Status: SHIPPED | OUTPATIENT
Start: 2024-12-10

## 2024-12-12 ENCOUNTER — LAB (OUTPATIENT)
Dept: LAB | Facility: LAB | Age: 84
End: 2024-12-12
Payer: MEDICARE

## 2024-12-12 DIAGNOSIS — E89.0 POSTOPERATIVE HYPOTHYROIDISM: ICD-10-CM

## 2024-12-12 LAB
T4 FREE SERPL-MCNC: 1.35 NG/DL (ref 0.78–1.48)
TSH SERPL-ACNC: 3.25 MIU/L (ref 0.44–3.98)

## 2024-12-12 PROCEDURE — 36415 COLL VENOUS BLD VENIPUNCTURE: CPT

## 2024-12-12 PROCEDURE — 84443 ASSAY THYROID STIM HORMONE: CPT

## 2024-12-12 PROCEDURE — 84439 ASSAY OF FREE THYROXINE: CPT

## 2024-12-17 ENCOUNTER — APPOINTMENT (OUTPATIENT)
Dept: PRIMARY CARE | Facility: CLINIC | Age: 84
End: 2024-12-17
Payer: MEDICARE

## 2024-12-17 VITALS
OXYGEN SATURATION: 99 % | HEIGHT: 62 IN | WEIGHT: 116 LBS | SYSTOLIC BLOOD PRESSURE: 116 MMHG | BODY MASS INDEX: 21.35 KG/M2 | HEART RATE: 77 BPM | DIASTOLIC BLOOD PRESSURE: 72 MMHG

## 2024-12-17 DIAGNOSIS — E78.5 MILD HYPERLIPIDEMIA: ICD-10-CM

## 2024-12-17 DIAGNOSIS — E89.0 POSTOPERATIVE HYPOTHYROIDISM: ICD-10-CM

## 2024-12-17 DIAGNOSIS — I10 ESSENTIAL HYPERTENSION: Primary | ICD-10-CM

## 2024-12-17 DIAGNOSIS — G47.00 INSOMNIA, UNSPECIFIED TYPE: ICD-10-CM

## 2024-12-17 DIAGNOSIS — E55.9 VITAMIN D DEFICIENCY: ICD-10-CM

## 2024-12-17 DIAGNOSIS — R73.03 PREDIABETES: ICD-10-CM

## 2024-12-17 PROCEDURE — 99214 OFFICE O/P EST MOD 30 MIN: CPT | Performed by: INTERNAL MEDICINE

## 2024-12-17 PROCEDURE — 1157F ADVNC CARE PLAN IN RCRD: CPT | Performed by: INTERNAL MEDICINE

## 2024-12-17 PROCEDURE — 3078F DIAST BP <80 MM HG: CPT | Performed by: INTERNAL MEDICINE

## 2024-12-17 PROCEDURE — 1036F TOBACCO NON-USER: CPT | Performed by: INTERNAL MEDICINE

## 2024-12-17 PROCEDURE — 1159F MED LIST DOCD IN RCRD: CPT | Performed by: INTERNAL MEDICINE

## 2024-12-17 PROCEDURE — 3074F SYST BP LT 130 MM HG: CPT | Performed by: INTERNAL MEDICINE

## 2024-12-17 PROCEDURE — 1160F RVW MEDS BY RX/DR IN RCRD: CPT | Performed by: INTERNAL MEDICINE

## 2024-12-17 PROCEDURE — 1124F ACP DISCUSS-NO DSCNMKR DOCD: CPT | Performed by: INTERNAL MEDICINE

## 2024-12-17 RX ORDER — AMLODIPINE BESYLATE 2.5 MG/1
2.5 TABLET ORAL DAILY
Qty: 90 TABLET | Refills: 3 | Status: SHIPPED | OUTPATIENT
Start: 2024-12-17

## 2024-12-17 RX ORDER — HYDROXYZINE HYDROCHLORIDE 10 MG/1
10 TABLET, FILM COATED ORAL NIGHTLY
Qty: 180 TABLET | Refills: 1 | Status: SHIPPED | OUTPATIENT
Start: 2024-12-17

## 2024-12-17 RX ORDER — AMLODIPINE BESYLATE 5 MG/1
5 TABLET ORAL DAILY
Qty: 90 TABLET | Refills: 3 | Status: SHIPPED | OUTPATIENT
Start: 2024-12-17

## 2024-12-17 ASSESSMENT — ENCOUNTER SYMPTOMS
DEPRESSED MOOD: 0
PALPITATIONS: 0
ABDOMINAL PAIN: 0
FATIGUE: 0
HYPERTENSION: 1
MYALGIAS: 0
SHORTNESS OF BREATH: 0
TREMORS: 0
NERVOUS/ANXIOUS: 0

## 2024-12-17 NOTE — PROGRESS NOTES
"Subjective   Patient ID: Vera Fleming is a 84 y.o. female who presents for Follow-up chronic medical problems.    Here with .  Fell on thanksgiving, tailbone pain.  Still sensitive but no pain.  No change in physical activities.  Tylenol for pain.  Meds and labs reviewed.    Hypertension  This is a chronic problem. The current episode started more than 1 year ago. The problem has been resolved since onset. The problem is controlled. Pertinent negatives include no chest pain, palpitations or shortness of breath. The current treatment provides significant improvement. There are no compliance problems.  Identifiable causes of hypertension include a thyroid problem.   Hyperlipidemia  This is a chronic problem. The current episode started more than 1 year ago. The problem is controlled. Recent lipid tests were reviewed and are low. Pertinent negatives include no chest pain, myalgias or shortness of breath. Current antihyperlipidemic treatment includes statins. The current treatment provides significant improvement of lipids. There are no compliance problems.    Thyroid Problem  Presents for follow-up visit. Patient reports no anxiety, depressed mood, fatigue, palpitations or tremors. The symptoms have been stable. Her past medical history is significant for hyperlipidemia.        Review of Systems   Constitutional:  Negative for fatigue.   Respiratory:  Negative for shortness of breath.    Cardiovascular:  Negative for chest pain and palpitations.   Gastrointestinal:  Negative for abdominal pain.   Musculoskeletal:  Negative for myalgias.   Neurological:  Negative for tremors.   Psychiatric/Behavioral:  The patient is not nervous/anxious.        Objective   /72 (BP Location: Left arm, Patient Position: Sitting)   Pulse 77   Ht 1.575 m (5' 2\")   Wt 52.6 kg (116 lb)   SpO2 99%   BMI 21.22 kg/m²     Physical Exam  Constitutional:       Appearance: Normal appearance.   Neck:      Vascular: No carotid " bruit.   Cardiovascular:      Rate and Rhythm: Normal rate and regular rhythm.      Pulses: Normal pulses.      Heart sounds: Normal heart sounds.   Pulmonary:      Effort: Pulmonary effort is normal.      Breath sounds: Normal breath sounds.   Neurological:      General: No focal deficit present.      Mental Status: She is alert.   Psychiatric:         Mood and Affect: Mood normal.         Behavior: Behavior normal.         Thought Content: Thought content normal.         Judgment: Judgment normal.         Assessment/Plan   Problem List Items Addressed This Visit             ICD-10-CM    Essential hypertension - Primary I10     BP at goal with current medications.  Rec low salt diet.  Check BP at home, goal < 140/90.           Relevant Medications    amLODIPine (Norvasc) 5 mg tablet    amLODIPine (Norvasc) 2.5 mg tablet    Other Relevant Orders    Basic metabolic panel    CBC and Auto Differential    Comprehensive metabolic panel    Urinalysis with Reflex Microscopic    Insomnia G47.00     Condition improved/rich with atarax.         Relevant Medications    hydrOXYzine HCL (Atarax) 10 mg tablet    Mild hyperlipidemia E78.5     Lipids, LDL at goal with statin.  Recommend low fat/cholesterol diet.           Relevant Orders    Lipid Panel    ALT    CBC and Auto Differential    Comprehensive metabolic panel    Lipid Panel    Postoperative hypothyroidism E89.0     Thyroid, free T4 and TSH rich with levothyroxine or synthroid.  Take medication 30 to 60 minutes before breakfast.           Relevant Orders    TSH    T4, free    Prediabetes R73.03     A1c 6.1 with diet.         Relevant Orders    Basic metabolic panel    Hemoglobin A1c    CBC and Auto Differential    Comprehensive metabolic panel    Hemoglobin A1c    Urinalysis with Reflex Microscopic    Vitamin D deficiency E55.9     Continue D3.         Relevant Orders    Vitamin D 25-Hydroxy,Total (for eval of Vitamin D levels)

## 2024-12-23 ENCOUNTER — TELEPHONE (OUTPATIENT)
Dept: PRIMARY CARE | Facility: CLINIC | Age: 84
End: 2024-12-23
Payer: MEDICARE

## 2024-12-23 NOTE — TELEPHONE ENCOUNTER
Pt LM, sts she was in ER 3days ago for sciatica issues and has canceled her upcoming trip.   Sts she is using ibuprofen 200mg TID and pepcid max strength.  She has been told that mobic is much better for this and is requesting an RX for this and a muscle relaxer

## 2024-12-24 DIAGNOSIS — M54.30 SCIATICA, UNSPECIFIED LATERALITY: Primary | ICD-10-CM

## 2024-12-24 RX ORDER — MELOXICAM 7.5 MG/1
7.5 TABLET ORAL 2 TIMES DAILY PRN
Qty: 30 TABLET | Refills: 3 | Status: SHIPPED | OUTPATIENT
Start: 2024-12-24

## 2024-12-24 RX ORDER — METHYLPREDNISOLONE 4 MG/1
TABLET ORAL
Qty: 21 TABLET | Refills: 0 | Status: SHIPPED | OUTPATIENT
Start: 2024-12-24 | End: 2024-12-30

## 2024-12-24 RX ORDER — CYCLOBENZAPRINE HCL 5 MG
5 TABLET ORAL NIGHTLY PRN
Qty: 30 TABLET | Refills: 0 | Status: SHIPPED | OUTPATIENT
Start: 2024-12-24 | End: 2025-01-23

## 2024-12-27 ENCOUNTER — TELEPHONE (OUTPATIENT)
Dept: PRIMARY CARE | Facility: CLINIC | Age: 84
End: 2024-12-27
Payer: MEDICARE

## 2024-12-27 NOTE — TELEPHONE ENCOUNTER
Pt called and wanted to know if  she could take advil instead of meloxicam  as she had read about meloxicam it can cause heart issues, pt would like to be reassured that is ok with you that she takes advil, I asked if Advil relieves her pain and she stated yes , but she would like to hear from you, please advise

## 2025-01-13 ENCOUNTER — TELEPHONE (OUTPATIENT)
Dept: PRIMARY CARE | Facility: CLINIC | Age: 85
End: 2025-01-13
Payer: MEDICARE

## 2025-01-13 NOTE — TELEPHONE ENCOUNTER
Pt called and still is having sciatic pain and is requesting MRI ,for spinal stenosis ,lumbosacral spondylosis , please advise pt would like [facility] please advise

## 2025-01-21 ENCOUNTER — APPOINTMENT (OUTPATIENT)
Dept: PRIMARY CARE | Facility: CLINIC | Age: 85
End: 2025-01-21
Payer: MEDICARE

## 2025-01-21 VITALS
HEIGHT: 62 IN | HEART RATE: 73 BPM | BODY MASS INDEX: 21.35 KG/M2 | SYSTOLIC BLOOD PRESSURE: 126 MMHG | OXYGEN SATURATION: 100 % | DIASTOLIC BLOOD PRESSURE: 74 MMHG | WEIGHT: 116 LBS

## 2025-01-21 DIAGNOSIS — M48.061 FORAMINAL STENOSIS OF LUMBAR REGION: ICD-10-CM

## 2025-01-21 DIAGNOSIS — M48.061 SPINAL STENOSIS OF LUMBAR REGION, UNSPECIFIED WHETHER NEUROGENIC CLAUDICATION PRESENT: ICD-10-CM

## 2025-01-21 DIAGNOSIS — M79.605 PAIN IN BOTH LOWER EXTREMITIES: Primary | ICD-10-CM

## 2025-01-21 DIAGNOSIS — M79.604 PAIN IN BOTH LOWER EXTREMITIES: Primary | ICD-10-CM

## 2025-01-21 PROCEDURE — 1036F TOBACCO NON-USER: CPT | Performed by: INTERNAL MEDICINE

## 2025-01-21 PROCEDURE — 1157F ADVNC CARE PLAN IN RCRD: CPT | Performed by: INTERNAL MEDICINE

## 2025-01-21 PROCEDURE — 99215 OFFICE O/P EST HI 40 MIN: CPT | Performed by: INTERNAL MEDICINE

## 2025-01-21 PROCEDURE — 1160F RVW MEDS BY RX/DR IN RCRD: CPT | Performed by: INTERNAL MEDICINE

## 2025-01-21 PROCEDURE — 3074F SYST BP LT 130 MM HG: CPT | Performed by: INTERNAL MEDICINE

## 2025-01-21 PROCEDURE — 1159F MED LIST DOCD IN RCRD: CPT | Performed by: INTERNAL MEDICINE

## 2025-01-21 PROCEDURE — 3078F DIAST BP <80 MM HG: CPT | Performed by: INTERNAL MEDICINE

## 2025-01-21 RX ORDER — ACETAMINOPHEN 500 MG
2000 TABLET ORAL DAILY
COMMUNITY

## 2025-01-21 RX ORDER — CYCLOBENZAPRINE HCL 5 MG
5 TABLET ORAL NIGHTLY PRN
Qty: 30 TABLET | Refills: 0 | Status: SHIPPED | OUTPATIENT
Start: 2025-01-21 | End: 2025-02-20

## 2025-01-21 ASSESSMENT — ENCOUNTER SYMPTOMS
WEAKNESS: 0
FATIGUE: 0
NUMBNESS: 0
SHORTNESS OF BREATH: 0
BACK PAIN: 0

## 2025-01-21 NOTE — PROGRESS NOTES
"Subjective   Patient ID: Vera Fleming is a 84 y.o. female who presents for back pain and Other (Sciatic pain).    Here with .  Fell after Thanksgiving.  No immediate pain.  On 12-18-24, low back pain with referred pain both legs.  ER same day.  Xrays showed no fracture, DDD changes.  Past one month, continues to have pain, with referred pain into both legs to knee.  Pain is worse with walking, especially when advil wears off.  Seems better with sitting and resting.  Unable to lay flat in bed.  Props knees up with pillows.  Some relief with medrol dose pack.  No bladder or bowel incontinence.  Advil for pain relief.  Flexeril at bedtime.  Trying to get appt at pain center.    MRI L spine: 9-2022  Severe L spinal stenosis at L4-5  Moderate to severe left foraminal narrowing at L5-S1.       Review of Systems   Constitutional:  Negative for fatigue.   Respiratory:  Negative for shortness of breath.    Cardiovascular:  Negative for chest pain.   Musculoskeletal:  Negative for back pain.        Buttock and leg pain   Neurological:  Negative for weakness and numbness.       Objective   /74 (BP Location: Right arm, Patient Position: Sitting)   Pulse 73   Ht 1.575 m (5' 2\")   Wt 52.6 kg (116 lb)   SpO2 100%   BMI 21.22 kg/m²     Physical Exam  Constitutional:       Appearance: Normal appearance.   Musculoskeletal:         General: No tenderness. Normal range of motion.   Neurological:      General: No focal deficit present.      Mental Status: She is alert.      Sensory: No sensory deficit.      Motor: No weakness.      Coordination: Coordination normal.      Gait: Gait normal.      Deep Tendon Reflexes: Reflexes normal.   Psychiatric:         Mood and Affect: Mood normal.         Behavior: Behavior normal.         Thought Content: Thought content normal.         Judgment: Judgment normal.       Assessment/Plan     Rec PT.  Ok to continue advil, pt taking 3 tabs daily  Can combine with tylenol XS or " arthritis.  Ok to continue flexeril bedtime.  MRI 2022, severe L stenosis at L4-5, moderate to severe foraminal narrowing left L5-S1.  Pt to schedule appt with pain center.  Fu 4-6 wks.    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

## 2025-02-06 ENCOUNTER — APPOINTMENT (OUTPATIENT)
Dept: PRIMARY CARE | Facility: CLINIC | Age: 85
End: 2025-02-06
Payer: MEDICARE

## 2025-02-24 ENCOUNTER — TELEPHONE (OUTPATIENT)
Dept: PRIMARY CARE | Facility: CLINIC | Age: 85
End: 2025-02-24
Payer: MEDICARE

## 2025-02-24 NOTE — TELEPHONE ENCOUNTER
Pt called in cx appt for 3/4 due to her not getting around well, she is on a walker,she wants to know how long she can stay on Meloxicam? Pt states she is going to get her labs done once she is getting around a little better, please advise   
Pt is supposed to see him in another 6-8 weeks , pt is going to get lab tests soon, and will keep her hydration up    
negative - no suicidal, no depression

## 2025-03-04 ENCOUNTER — APPOINTMENT (OUTPATIENT)
Dept: PRIMARY CARE | Facility: CLINIC | Age: 85
End: 2025-03-04
Payer: MEDICARE

## 2025-03-04 LAB
T4 FREE SERPL-MCNC: 1.3 NG/DL (ref 0.8–1.8)
TSH SERPL-ACNC: 11.17 MIU/L (ref 0.4–4.5)

## 2025-03-05 ENCOUNTER — TELEPHONE (OUTPATIENT)
Dept: PRIMARY CARE | Facility: CLINIC | Age: 85
End: 2025-03-05
Payer: MEDICARE

## 2025-03-05 LAB
25(OH)D3+25(OH)D2 SERPL-MCNC: 64 NG/ML (ref 30–100)
ALBUMIN SERPL-MCNC: 4.4 G/DL (ref 3.6–5.1)
ALP SERPL-CCNC: 120 U/L (ref 37–153)
ALT SERPL-CCNC: 18 U/L (ref 6–29)
ANION GAP SERPL CALCULATED.4IONS-SCNC: 8 MMOL/L (CALC) (ref 7–17)
ANION GAP SERPL CALCULATED.4IONS-SCNC: 8 MMOL/L (CALC) (ref 7–17)
AST SERPL-CCNC: 21 U/L (ref 10–35)
BASOPHILS # BLD AUTO: 13 CELLS/UL (ref 0–200)
BASOPHILS NFR BLD AUTO: 0.2 %
BILIRUB SERPL-MCNC: 0.7 MG/DL (ref 0.2–1.2)
BUN SERPL-MCNC: 19 MG/DL (ref 7–25)
BUN SERPL-MCNC: 19 MG/DL (ref 7–25)
BUN/CREAT SERPL: NORMAL (CALC) (ref 6–22)
CALCIUM SERPL-MCNC: 9.1 MG/DL (ref 8.6–10.4)
CALCIUM SERPL-MCNC: 9.1 MG/DL (ref 8.6–10.4)
CHLORIDE SERPL-SCNC: 100 MMOL/L (ref 98–110)
CHLORIDE SERPL-SCNC: 100 MMOL/L (ref 98–110)
CHOLEST SERPL-MCNC: 170 MG/DL
CHOLEST/HDLC SERPL: 2.2 (CALC)
CO2 SERPL-SCNC: 29 MMOL/L (ref 20–32)
CO2 SERPL-SCNC: 29 MMOL/L (ref 20–32)
CREAT SERPL-MCNC: 0.82 MG/DL (ref 0.6–0.95)
CREAT SERPL-MCNC: 0.82 MG/DL (ref 0.6–0.95)
EGFRCR SERPLBLD CKD-EPI 2021: 71 ML/MIN/1.73M2
EGFRCR SERPLBLD CKD-EPI 2021: 71 ML/MIN/1.73M2
EOSINOPHIL # BLD AUTO: 0 CELLS/UL (ref 15–500)
EOSINOPHIL NFR BLD AUTO: 0 %
ERYTHROCYTE [DISTWIDTH] IN BLOOD BY AUTOMATED COUNT: 12.5 % (ref 11–15)
EST. AVERAGE GLUCOSE BLD GHB EST-MCNC: 128 MG/DL
EST. AVERAGE GLUCOSE BLD GHB EST-SCNC: 7.1 MMOL/L
GLUCOSE SERPL-MCNC: 83 MG/DL (ref 65–99)
GLUCOSE SERPL-MCNC: 83 MG/DL (ref 65–99)
HBA1C MFR BLD: 6.1 % OF TOTAL HGB
HCT VFR BLD AUTO: 39.9 % (ref 35–45)
HDLC SERPL-MCNC: 79 MG/DL
HGB BLD-MCNC: 13 G/DL (ref 11.7–15.5)
LDLC SERPL CALC-MCNC: 76 MG/DL (CALC)
LYMPHOCYTES # BLD AUTO: 1376 CELLS/UL (ref 850–3900)
LYMPHOCYTES NFR BLD AUTO: 21.5 %
MCH RBC QN AUTO: 29.2 PG (ref 27–33)
MCHC RBC AUTO-ENTMCNC: 32.6 G/DL (ref 32–36)
MCV RBC AUTO: 89.7 FL (ref 80–100)
MONOCYTES # BLD AUTO: 550 CELLS/UL (ref 200–950)
MONOCYTES NFR BLD AUTO: 8.6 %
NEUTROPHILS # BLD AUTO: 4461 CELLS/UL (ref 1500–7800)
NEUTROPHILS NFR BLD AUTO: 69.7 %
NONHDLC SERPL-MCNC: 91 MG/DL (CALC)
PLATELET # BLD AUTO: 342 THOUSAND/UL (ref 140–400)
PMV BLD REES-ECKER: 9.8 FL (ref 7.5–12.5)
POTASSIUM SERPL-SCNC: 4.5 MMOL/L (ref 3.5–5.3)
POTASSIUM SERPL-SCNC: 4.5 MMOL/L (ref 3.5–5.3)
PROT SERPL-MCNC: 7.2 G/DL (ref 6.1–8.1)
RBC # BLD AUTO: 4.45 MILLION/UL (ref 3.8–5.1)
SODIUM SERPL-SCNC: 137 MMOL/L (ref 135–146)
SODIUM SERPL-SCNC: 137 MMOL/L (ref 135–146)
TRIGL SERPL-MCNC: 71 MG/DL
WBC # BLD AUTO: 6.4 THOUSAND/UL (ref 3.8–10.8)

## 2025-03-05 NOTE — TELEPHONE ENCOUNTER
----- Message from Ronnie Bryant sent at 3/5/2025  3:26 PM EST -----  Blood count ok  A1c 6.1, BS good control  Thyroid ok, TSH a little high but T4 normal  No change in thyroid med  Can fu with next ov  Cholesterol 170  Chem panel normal  D level normal

## 2025-03-12 ENCOUNTER — APPOINTMENT (OUTPATIENT)
Dept: OTOLARYNGOLOGY | Facility: CLINIC | Age: 85
End: 2025-03-12
Payer: MEDICARE

## 2025-05-20 DIAGNOSIS — I10 ESSENTIAL HYPERTENSION: ICD-10-CM

## 2025-05-20 RX ORDER — AMLODIPINE BESYLATE 5 MG/1
5 TABLET ORAL DAILY
Qty: 90 TABLET | Refills: 3 | Status: SHIPPED | OUTPATIENT
Start: 2025-05-20

## 2025-05-27 DIAGNOSIS — E78.5 MILD HYPERLIPIDEMIA: ICD-10-CM

## 2025-05-27 RX ORDER — ROSUVASTATIN CALCIUM 5 MG/1
5 TABLET, COATED ORAL DAILY
Qty: 90 TABLET | Refills: 3 | Status: SHIPPED | OUTPATIENT
Start: 2025-05-27

## 2025-05-28 ENCOUNTER — OFFICE VISIT (OUTPATIENT)
Dept: PRIMARY CARE | Facility: CLINIC | Age: 85
End: 2025-05-28
Payer: MEDICARE

## 2025-05-28 VITALS
HEIGHT: 62 IN | DIASTOLIC BLOOD PRESSURE: 72 MMHG | BODY MASS INDEX: 20.98 KG/M2 | SYSTOLIC BLOOD PRESSURE: 120 MMHG | HEART RATE: 73 BPM | WEIGHT: 114 LBS | OXYGEN SATURATION: 97 %

## 2025-05-28 DIAGNOSIS — S76.311A PARTIAL TEAR OF RIGHT HAMSTRING: ICD-10-CM

## 2025-05-28 DIAGNOSIS — S76.312A PARTIAL TEAR OF LEFT HAMSTRING: ICD-10-CM

## 2025-05-28 DIAGNOSIS — M54.30 SCIATICA, UNSPECIFIED LATERALITY: ICD-10-CM

## 2025-05-28 DIAGNOSIS — M84.454D: Primary | ICD-10-CM

## 2025-05-28 DIAGNOSIS — M80.00XD AGE-RELATED OSTEOPOROSIS WITH CURRENT PATHOLOGICAL FRACTURE WITH ROUTINE HEALING: ICD-10-CM

## 2025-05-28 PROBLEM — J47.9 BRONCHIECTASIS, UNCOMPLICATED (MULTI): Status: RESOLVED | Noted: 2023-09-26 | Resolved: 2025-05-28

## 2025-05-28 PROCEDURE — 1160F RVW MEDS BY RX/DR IN RCRD: CPT | Performed by: INTERNAL MEDICINE

## 2025-05-28 PROCEDURE — 99214 OFFICE O/P EST MOD 30 MIN: CPT | Performed by: INTERNAL MEDICINE

## 2025-05-28 PROCEDURE — 1159F MED LIST DOCD IN RCRD: CPT | Performed by: INTERNAL MEDICINE

## 2025-05-28 PROCEDURE — 3074F SYST BP LT 130 MM HG: CPT | Performed by: INTERNAL MEDICINE

## 2025-05-28 PROCEDURE — 1036F TOBACCO NON-USER: CPT | Performed by: INTERNAL MEDICINE

## 2025-05-28 PROCEDURE — 3078F DIAST BP <80 MM HG: CPT | Performed by: INTERNAL MEDICINE

## 2025-05-28 PROCEDURE — 1124F ACP DISCUSS-NO DSCNMKR DOCD: CPT | Performed by: INTERNAL MEDICINE

## 2025-05-28 RX ORDER — LEVOTHYROXINE SODIUM 88 UG/1
88 TABLET ORAL DAILY
Qty: 90 TABLET | Refills: 3 | Status: SHIPPED | OUTPATIENT
Start: 2025-05-28

## 2025-05-28 RX ORDER — DEXTROMETHORPHAN HYDROBROMIDE, GUAIFENESIN 5; 100 MG/5ML; MG/5ML
650 LIQUID ORAL EVERY 8 HOURS PRN
COMMUNITY

## 2025-05-28 RX ORDER — VIT C/E/ZN/COPPR/LUTEIN/ZEAXAN 250MG-90MG
25 CAPSULE ORAL DAILY
Qty: 90 CAPSULE | Refills: 3 | Status: SHIPPED | OUTPATIENT
Start: 2025-05-28

## 2025-05-28 RX ORDER — MELOXICAM 7.5 MG/1
7.5 TABLET ORAL 2 TIMES DAILY PRN
Qty: 30 TABLET | Refills: 3 | Status: SHIPPED | OUTPATIENT
Start: 2025-05-28

## 2025-05-28 ASSESSMENT — ENCOUNTER SYMPTOMS
SHORTNESS OF BREATH: 0
FATIGUE: 0

## 2025-05-28 NOTE — PROGRESS NOTES
"Subjective   Patient ID: Vera Fleming is a 84 y.o. female who presents for Other (Pain left buttock/thigh-ongoing/Stopped meloxicam, now using tylenol/Follow up from pain management & orthopedic.  After MRI).    Ortho evaluated hip and groin pain, 2-2025  MRI showed fracture in pelvis.  Fell last Thanksgiving on buttocks.  Also noted hamstring and obturator externus tears.  PM evaluations reviewed.  Osteoporosis clinic opined reclast given pelvic insufficiency fractures.  [facility] records reviewed.  Meds reviewed.         Review of Systems   Constitutional:  Negative for fatigue.   Respiratory:  Negative for shortness of breath.    Cardiovascular:  Negative for chest pain.   Musculoskeletal:         Pelvic and buttocks pain.       Objective   /72 (BP Location: Right arm, Patient Position: Sitting)   Pulse 73   Ht 1.575 m (5' 2\")   Wt 51.7 kg (114 lb)   SpO2 97%   BMI 20.85 kg/m²     Physical Exam  Constitutional:       Appearance: Normal appearance.   Neurological:      General: No focal deficit present.      Mental Status: She is alert.      Motor: Weakness present.      Gait: Gait abnormal.   Psychiatric:         Mood and Affect: Mood normal.         Behavior: Behavior normal.         Thought Content: Thought content normal.         Judgment: Judgment normal.       Assessment/Plan     Ok to resume meloxicam 7.5 mg daily.  Rec tylenol XS 1-2 tabs twice daily.  Pt will fu with [facility] for hamstring tears and PT.  Fu with osteoporosis clinic as directed.  Fu 7-2025.    Problem List Items Addressed This Visit           ICD-10-CM    Age-related osteoporosis with current pathological fracture with routine healing M80.00XD    Insufficiency fracture of pelvis with routine healing - Primary M84.454D    Partial tear of right hamstring S76.311A    Sciatica M54.30    Relevant Medications    meloxicam (Mobic) 7.5 mg tablet          "

## 2025-07-15 ENCOUNTER — APPOINTMENT (OUTPATIENT)
Dept: PRIMARY CARE | Facility: CLINIC | Age: 85
End: 2025-07-15
Payer: MEDICARE

## 2025-07-15 VITALS
WEIGHT: 112 LBS | HEART RATE: 70 BPM | OXYGEN SATURATION: 95 % | HEIGHT: 62 IN | BODY MASS INDEX: 20.61 KG/M2 | DIASTOLIC BLOOD PRESSURE: 66 MMHG | SYSTOLIC BLOOD PRESSURE: 118 MMHG

## 2025-07-15 DIAGNOSIS — R73.03 PREDIABETES: ICD-10-CM

## 2025-07-15 DIAGNOSIS — I10 ESSENTIAL HYPERTENSION: ICD-10-CM

## 2025-07-15 DIAGNOSIS — E78.5 MILD HYPERLIPIDEMIA: ICD-10-CM

## 2025-07-15 DIAGNOSIS — E89.0 POSTOPERATIVE HYPOTHYROIDISM: ICD-10-CM

## 2025-07-15 DIAGNOSIS — Z00.00 ROUTINE GENERAL MEDICAL EXAMINATION AT HEALTH CARE FACILITY: Primary | ICD-10-CM

## 2025-07-15 PROCEDURE — G0439 PPPS, SUBSEQ VISIT: HCPCS | Performed by: INTERNAL MEDICINE

## 2025-07-15 PROCEDURE — 1159F MED LIST DOCD IN RCRD: CPT | Performed by: INTERNAL MEDICINE

## 2025-07-15 PROCEDURE — 3078F DIAST BP <80 MM HG: CPT | Performed by: INTERNAL MEDICINE

## 2025-07-15 PROCEDURE — 99214 OFFICE O/P EST MOD 30 MIN: CPT | Performed by: INTERNAL MEDICINE

## 2025-07-15 PROCEDURE — 3074F SYST BP LT 130 MM HG: CPT | Performed by: INTERNAL MEDICINE

## 2025-07-15 PROCEDURE — 1160F RVW MEDS BY RX/DR IN RCRD: CPT | Performed by: INTERNAL MEDICINE

## 2025-07-15 PROCEDURE — 1170F FXNL STATUS ASSESSED: CPT | Performed by: INTERNAL MEDICINE

## 2025-07-15 ASSESSMENT — ENCOUNTER SYMPTOMS
HYPERTENSION: 1
NERVOUS/ANXIOUS: 0
PALPITATIONS: 0
SHORTNESS OF BREATH: 0
DIZZINESS: 0
MYALGIAS: 0
TREMORS: 0
DEPRESSED MOOD: 0
FATIGUE: 0
ABDOMINAL PAIN: 0

## 2025-07-15 ASSESSMENT — PATIENT HEALTH QUESTIONNAIRE - PHQ9
2. FEELING DOWN, DEPRESSED OR HOPELESS: NOT AT ALL
1. LITTLE INTEREST OR PLEASURE IN DOING THINGS: NOT AT ALL
SUM OF ALL RESPONSES TO PHQ9 QUESTIONS 1 AND 2: 0

## 2025-07-15 ASSESSMENT — ACTIVITIES OF DAILY LIVING (ADL)
DRESSING: INDEPENDENT
GROCERY_SHOPPING: INDEPENDENT
DOING_HOUSEWORK: INDEPENDENT
TAKING_MEDICATION: INDEPENDENT
MANAGING_FINANCES: INDEPENDENT
BATHING: INDEPENDENT

## 2025-07-15 NOTE — PROGRESS NOTES
Subjective   Reason for Visit: Vera Fleming is an 85 y.o. female here for a Medicare Wellness visit and chronic medical problems.    Past Medical, Surgical, and Family History reviewed and updated in chart.    Reviewed all medications by prescribing practitioner or clinical pharmacist (such as prescriptions, OTCs, herbal therapies and supplements) and documented in the medical record.    Hypertension  This is a chronic problem. The current episode started more than 1 year ago. The problem has been resolved since onset. The problem is controlled. Pertinent negatives include no chest pain, palpitations or shortness of breath. The current treatment provides significant improvement. There are no compliance problems.  Identifiable causes of hypertension include a thyroid problem.   Hyperlipidemia  This is a chronic problem. The current episode started more than 1 year ago. The problem is controlled. Recent lipid tests were reviewed and are low. Pertinent negatives include no chest pain, myalgias or shortness of breath. Current antihyperlipidemic treatment includes statins. The current treatment provides significant improvement of lipids. There are no compliance problems.    Thyroid Problem  Presents for follow-up visit. Patient reports no anxiety, depressed mood, fatigue, palpitations or tremors. The symptoms have been stable. Her past medical history is significant for hyperlipidemia.       Patient Care Team:  Ronnie Bryant MD as PCP - General  Ronnie Bryant MD as PCP - [employer] PCP     Review of Systems   Constitutional:  Negative for fatigue.   Respiratory:  Negative for shortness of breath.    Cardiovascular:  Negative for chest pain and palpitations.   Gastrointestinal:  Negative for abdominal pain.   Musculoskeletal:  Negative for myalgias.   Neurological:  Negative for dizziness and tremors.   Psychiatric/Behavioral:  The patient is not nervous/anxious.        Objective   Vitals:  BP  "118/66 (BP Location: Left arm, Patient Position: Sitting)   Pulse 70   Ht 1.575 m (5' 2\")   Wt 50.8 kg (112 lb)   SpO2 95%   BMI 20.49 kg/m²       Physical Exam  Constitutional:       Appearance: Normal appearance.   Neck:      Vascular: No carotid bruit.   Cardiovascular:      Rate and Rhythm: Normal rate and regular rhythm.      Pulses: Normal pulses.      Heart sounds: Normal heart sounds.   Pulmonary:      Effort: Pulmonary effort is normal.      Breath sounds: Normal breath sounds.   Neurological:      General: No focal deficit present.      Mental Status: She is alert.   Psychiatric:         Mood and Affect: Mood normal.         Behavior: Behavior normal.         Thought Content: Thought content normal.         Judgment: Judgment normal.       Assessment & Plan  Routine general medical examination at health care facility    Orders:  •  1 Year Follow Up In Primary Care - Wellness Exam; Future    Essential hypertension  BP at goal with current medications.  Rec low salt diet.  Check BP at home, goal < 140/90.         Mild hyperlipidemia  Lipids, LDL at goal with statin.  Recommend low fat/cholesterol diet.         Prediabetes  A1c 6.1 with diet.    Orders:  •  Hemoglobin A1c; Future  •  Basic metabolic panel; Future    Postoperative hypothyroidism  Thyroid, free T4 and TSH rich with levothyroxine or synthroid.  Take medication 30 to 60 minutes before breakfast.                     "

## 2025-07-28 ENCOUNTER — TELEPHONE (OUTPATIENT)
Dept: PRIMARY CARE | Facility: CLINIC | Age: 85
End: 2025-07-28
Payer: MEDICARE

## 2025-07-28 DIAGNOSIS — R73.03 PREDIABETES: Primary | ICD-10-CM

## 2025-07-28 NOTE — TELEPHONE ENCOUNTER
Pt is requesting urinalysis with lab orders they are getting labs done tomorrow  7/29, please advise

## 2025-07-29 LAB
APPEARANCE UR: CLEAR
BILIRUB UR QL STRIP: NEGATIVE
COLOR UR: YELLOW
GLUCOSE UR QL STRIP: NEGATIVE
HGB UR QL STRIP: NEGATIVE
KETONES UR QL STRIP: NEGATIVE
LEUKOCYTE ESTERASE UR QL STRIP: NEGATIVE
NITRITE UR QL STRIP: NEGATIVE
PH UR STRIP: 6.5 [PH] (ref 5–8)
PROT UR QL STRIP: NEGATIVE
SP GR UR STRIP: 1.01 (ref 1–1.03)

## 2025-07-30 LAB
25(OH)D3+25(OH)D2 SERPL-MCNC: 63 NG/ML (ref 30–100)
ALBUMIN SERPL-MCNC: 4.6 G/DL (ref 3.6–5.1)
ALP SERPL-CCNC: 64 U/L (ref 37–153)
ALT SERPL-CCNC: 15 U/L (ref 6–29)
ANION GAP SERPL CALCULATED.4IONS-SCNC: 8 MMOL/L (CALC) (ref 7–17)
AST SERPL-CCNC: 20 U/L (ref 10–35)
BASOPHILS # BLD AUTO: 0 CELLS/UL (ref 0–200)
BASOPHILS NFR BLD AUTO: 0 %
BILIRUB SERPL-MCNC: 0.6 MG/DL (ref 0.2–1.2)
BUN SERPL-MCNC: 20 MG/DL (ref 7–25)
CALCIUM SERPL-MCNC: 9.2 MG/DL (ref 8.6–10.4)
CHLORIDE SERPL-SCNC: 102 MMOL/L (ref 98–110)
CHOLEST SERPL-MCNC: 171 MG/DL
CHOLEST/HDLC SERPL: 2 (CALC)
CO2 SERPL-SCNC: 26 MMOL/L (ref 20–32)
CREAT SERPL-MCNC: 0.7 MG/DL (ref 0.6–0.95)
EGFRCR SERPLBLD CKD-EPI 2021: 85 ML/MIN/1.73M2
EOSINOPHIL # BLD AUTO: 0 CELLS/UL (ref 15–500)
EOSINOPHIL NFR BLD AUTO: 0 %
ERYTHROCYTE [DISTWIDTH] IN BLOOD BY AUTOMATED COUNT: 12.8 % (ref 11–15)
EST. AVERAGE GLUCOSE BLD GHB EST-MCNC: 120 MG/DL
EST. AVERAGE GLUCOSE BLD GHB EST-SCNC: 6.6 MMOL/L
GLUCOSE SERPL-MCNC: 93 MG/DL (ref 65–99)
HBA1C MFR BLD: 5.8 %
HCT VFR BLD AUTO: 42 % (ref 35–45)
HDLC SERPL-MCNC: 85 MG/DL
HGB BLD-MCNC: 13.2 G/DL (ref 11.7–15.5)
LDLC SERPL CALC-MCNC: 71 MG/DL (CALC)
LYMPHOCYTES # BLD AUTO: 1590 CELLS/UL (ref 850–3900)
LYMPHOCYTES NFR BLD AUTO: 30 %
MCH RBC QN AUTO: 28.6 PG (ref 27–33)
MCHC RBC AUTO-ENTMCNC: 31.4 G/DL (ref 32–36)
MCV RBC AUTO: 91.1 FL (ref 80–100)
MONOCYTES # BLD AUTO: 525 CELLS/UL (ref 200–950)
MONOCYTES NFR BLD AUTO: 9.9 %
NEUTROPHILS # BLD AUTO: 3185 CELLS/UL (ref 1500–7800)
NEUTROPHILS NFR BLD AUTO: 60.1 %
NONHDLC SERPL-MCNC: 86 MG/DL (CALC)
PLATELET # BLD AUTO: 237 THOUSAND/UL (ref 140–400)
PMV BLD REES-ECKER: 10 FL (ref 7.5–12.5)
POTASSIUM SERPL-SCNC: 4.2 MMOL/L (ref 3.5–5.3)
PROT SERPL-MCNC: 7.3 G/DL (ref 6.1–8.1)
RBC # BLD AUTO: 4.61 MILLION/UL (ref 3.8–5.1)
SODIUM SERPL-SCNC: 136 MMOL/L (ref 135–146)
TRIGL SERPL-MCNC: 71 MG/DL
WBC # BLD AUTO: 5.3 THOUSAND/UL (ref 3.8–10.8)

## 2025-08-06 ENCOUNTER — APPOINTMENT (OUTPATIENT)
Dept: PRIMARY CARE | Facility: CLINIC | Age: 85
End: 2025-08-06
Payer: MEDICARE

## 2025-08-06 VITALS
BODY MASS INDEX: 20.61 KG/M2 | DIASTOLIC BLOOD PRESSURE: 72 MMHG | SYSTOLIC BLOOD PRESSURE: 126 MMHG | HEIGHT: 62 IN | OXYGEN SATURATION: 96 % | WEIGHT: 112 LBS | HEART RATE: 62 BPM

## 2025-08-06 DIAGNOSIS — I10 ESSENTIAL HYPERTENSION: ICD-10-CM

## 2025-08-06 DIAGNOSIS — S32.9XXG CLOSED NONDISPLACED FRACTURE OF PELVIS WITH DELAYED HEALING, UNSPECIFIED PART OF PELVIS, SUBSEQUENT ENCOUNTER: ICD-10-CM

## 2025-08-06 DIAGNOSIS — R73.03 PREDIABETES: Primary | ICD-10-CM

## 2025-08-06 PROBLEM — M54.9 UPPER BACK PAIN: Status: RESOLVED | Noted: 2023-02-02 | Resolved: 2025-08-06

## 2025-08-06 PROCEDURE — 3074F SYST BP LT 130 MM HG: CPT | Performed by: INTERNAL MEDICINE

## 2025-08-06 PROCEDURE — 1160F RVW MEDS BY RX/DR IN RCRD: CPT | Performed by: INTERNAL MEDICINE

## 2025-08-06 PROCEDURE — 3078F DIAST BP <80 MM HG: CPT | Performed by: INTERNAL MEDICINE

## 2025-08-06 PROCEDURE — 99214 OFFICE O/P EST MOD 30 MIN: CPT | Performed by: INTERNAL MEDICINE

## 2025-08-06 PROCEDURE — 1159F MED LIST DOCD IN RCRD: CPT | Performed by: INTERNAL MEDICINE

## 2025-08-06 ASSESSMENT — ENCOUNTER SYMPTOMS
SHORTNESS OF BREATH: 0
ABDOMINAL PAIN: 0
DIZZINESS: 0
FATIGUE: 0
HYPERTENSION: 1

## 2025-08-06 NOTE — PROGRESS NOTES
"Subjective   Patient ID: Vera Fleming is a 85 y.o. female who presents for Follow-up chronic medical problems.    Blood Sugar Problem  This is a chronic problem. The current episode started more than 1 year ago. The problem occurs intermittently. The problem has been waxing and waning. Associated symptoms include arthralgias. Pertinent negatives include no abdominal pain, chest pain or fatigue. The symptoms are aggravated by drinking and eating. The treatment provided moderate relief.   Hypertension  This is a chronic problem. The current episode started more than 1 year ago. The problem has been resolved since onset. The problem is controlled. Pertinent negatives include no chest pain or shortness of breath. The current treatment provides significant improvement. There are no compliance problems.         Review of Systems   Constitutional:  Negative for fatigue.   Respiratory:  Negative for shortness of breath.    Cardiovascular:  Negative for chest pain.   Gastrointestinal:  Negative for abdominal pain.   Musculoskeletal:  Positive for arthralgias and back pain.   Neurological:  Negative for dizziness.       Objective   /72 (BP Location: Right arm, Patient Position: Sitting)   Pulse 62   Ht 1.575 m (5' 2\")   Wt 50.8 kg (112 lb)   SpO2 96%   BMI 20.49 kg/m²     Physical Exam  Constitutional:       Appearance: Normal appearance.     Neurological:      General: No focal deficit present.      Mental Status: She is alert.     Psychiatric:         Mood and Affect: Mood normal.         Behavior: Behavior normal.         Thought Content: Thought content normal.         Judgment: Judgment normal.         Assessment/Plan   Problem List Items Addressed This Visit           ICD-10-CM    Essential hypertension I10    BP at goal with current medications.  Rec low salt diet.  Check BP at home, goal < 140/90.           Prediabetes - Primary R73.03    A1c 5.8 with diet.         Relevant Orders    Hemoglobin A1C    " Basic metabolic panel    Closed nondisplaced fracture of pelvis with delayed healing S32.9XXG    Fu MRI 6-2025 shows persistent pelvic fractures with delayed healing.  Continue PT as directed.  Rec tylenol for pain, up to 2000 mg daily.

## 2025-08-06 NOTE — ASSESSMENT & PLAN NOTE
Fu MRI 6-2025 shows persistent pelvic fractures with delayed healing.  Continue PT as directed.  Rec tylenol for pain, up to 2000 mg daily.

## 2025-08-07 ASSESSMENT — ENCOUNTER SYMPTOMS
BACK PAIN: 1
ARTHRALGIAS: 1

## 2025-12-10 ENCOUNTER — APPOINTMENT (OUTPATIENT)
Dept: PRIMARY CARE | Facility: CLINIC | Age: 85
End: 2025-12-10
Payer: MEDICARE